# Patient Record
Sex: FEMALE | Race: WHITE | NOT HISPANIC OR LATINO | Employment: OTHER | URBAN - METROPOLITAN AREA
[De-identification: names, ages, dates, MRNs, and addresses within clinical notes are randomized per-mention and may not be internally consistent; named-entity substitution may affect disease eponyms.]

---

## 2023-03-08 ENCOUNTER — NURSING HOME VISIT (OUTPATIENT)
Dept: PRIMARY CARE | Facility: CLINIC | Age: 68
End: 2023-03-08
Payer: MEDICAID

## 2023-03-08 DIAGNOSIS — E78.2 MIXED HYPERLIPIDEMIA: ICD-10-CM

## 2023-03-08 DIAGNOSIS — I10 PRIMARY HYPERTENSION: Primary | ICD-10-CM

## 2023-03-08 DIAGNOSIS — R53.81 DEBILITY: ICD-10-CM

## 2023-03-08 PROCEDURE — 99308 SBSQ NF CARE LOW MDM 20: CPT | Performed by: FAMILY MEDICINE

## 2023-03-08 ASSESSMENT — ENCOUNTER SYMPTOMS: HYPERTENSION: 1

## 2023-03-08 NOTE — PROGRESS NOTES
Subjective   Patient ID: Fanny Charles is a 67 y.o. female who presents for Hyperlipidemia, Hypertension, debility, and weakness.    Encounter limited by pt. debility    Hyperlipidemia    Hypertension         Review of Systems   Reason unable to perform ROS: debility.       Objective   There were no vitals taken for this visit.    Physical Exam  Vitals and nursing note reviewed.   Constitutional:       Appearance: She is obese.   HENT:      Head: Normocephalic and atraumatic.      Nose: Nose normal.      Mouth/Throat:      Mouth: Mucous membranes are moist.      Pharynx: Oropharynx is clear.   Eyes:      Extraocular Movements: Extraocular movements intact.      Conjunctiva/sclera: Conjunctivae normal.      Pupils: Pupils are equal, round, and reactive to light.   Cardiovascular:      Rate and Rhythm: Normal rate and regular rhythm.      Pulses: Normal pulses.   Pulmonary:      Effort: Pulmonary effort is normal.      Breath sounds: Normal breath sounds.   Abdominal:      General: Bowel sounds are normal.      Palpations: Abdomen is soft.   Musculoskeletal:         General: Normal range of motion.      Cervical back: Normal range of motion and neck supple.   Skin:     General: Skin is warm and dry.      Capillary Refill: Capillary refill takes less than 2 seconds.   Neurological:      General: No focal deficit present.      Mental Status: She is disoriented.         Assessment/Plan

## 2023-03-15 ENCOUNTER — NURSING HOME VISIT (OUTPATIENT)
Dept: POST ACUTE CARE | Facility: EXTERNAL LOCATION | Age: 68
End: 2023-03-15
Payer: MEDICAID

## 2023-03-15 DIAGNOSIS — I10 HYPERTENSION, UNSPECIFIED TYPE: ICD-10-CM

## 2023-03-15 DIAGNOSIS — E66.01 MORBID OBESITY (MULTI): ICD-10-CM

## 2023-03-15 DIAGNOSIS — R53.81 DEBILITY: Primary | ICD-10-CM

## 2023-03-15 DIAGNOSIS — E78.5 HYPERLIPIDEMIA, UNSPECIFIED HYPERLIPIDEMIA TYPE: ICD-10-CM

## 2023-03-15 DIAGNOSIS — G89.4 CHRONIC PAIN SYNDROME: ICD-10-CM

## 2023-03-15 PROBLEM — G89.29 CHRONIC PAIN: Status: ACTIVE | Noted: 2023-03-15

## 2023-03-15 PROCEDURE — 99308 SBSQ NF CARE LOW MDM 20: CPT | Performed by: FAMILY MEDICINE

## 2023-03-15 NOTE — PROGRESS NOTES
Subjective   Patient ID: Fanny Charles is a 67 y.o. female who is long term resident being seen and evaluated for multiple medical problems.    Debility, weakness, htn, hld all same. Chronic pain same         Review of Systems   Reason unable to perform ROS: debility.       Objective   There were no vitals taken for this visit.    Physical Exam  Vitals and nursing note reviewed.   Constitutional:       Appearance: She is obese. She is ill-appearing.   HENT:      Head: Normocephalic and atraumatic.      Nose: Nose normal.      Mouth/Throat:      Mouth: Mucous membranes are moist.      Pharynx: Oropharynx is clear.   Eyes:      Extraocular Movements: Extraocular movements intact.      Conjunctiva/sclera: Conjunctivae normal.      Pupils: Pupils are equal, round, and reactive to light.   Cardiovascular:      Rate and Rhythm: Normal rate and regular rhythm.      Pulses: Normal pulses.   Pulmonary:      Effort: Pulmonary effort is normal.      Breath sounds: Normal breath sounds.   Abdominal:      General: Bowel sounds are normal.      Palpations: Abdomen is soft.   Musculoskeletal:         General: Normal range of motion.      Cervical back: Normal range of motion and neck supple.   Skin:     General: Skin is warm and dry.      Capillary Refill: Capillary refill takes less than 2 seconds.   Neurological:      General: No focal deficit present.      Motor: Weakness present.   Psychiatric:      Comments: Flat affect         Assessment/Plan   Problem List Items Addressed This Visit          Nervous    Chronic pain       Circulatory    HTN (hypertension)       Endocrine/Metabolic    Morbid obesity (CMS/HCC)       Other    Debility - Primary    HLD (hyperlipidemia)        Goals    None

## 2023-03-15 NOTE — LETTER
Patient: Fanny Charles  : 1955    Encounter Date: 03/15/2023    Subjective  Patient ID: Fanny Charles is a 67 y.o. female who is long term resident being seen and evaluated for multiple medical problems.    Debility, weakness, htn, hld all same. Chronic pain same         Review of Systems   Reason unable to perform ROS: debility.       Objective  There were no vitals taken for this visit.    Physical Exam  Vitals and nursing note reviewed.   Constitutional:       Appearance: She is obese. She is ill-appearing.   HENT:      Head: Normocephalic and atraumatic.      Nose: Nose normal.      Mouth/Throat:      Mouth: Mucous membranes are moist.      Pharynx: Oropharynx is clear.   Eyes:      Extraocular Movements: Extraocular movements intact.      Conjunctiva/sclera: Conjunctivae normal.      Pupils: Pupils are equal, round, and reactive to light.   Cardiovascular:      Rate and Rhythm: Normal rate and regular rhythm.      Pulses: Normal pulses.   Pulmonary:      Effort: Pulmonary effort is normal.      Breath sounds: Normal breath sounds.   Abdominal:      General: Bowel sounds are normal.      Palpations: Abdomen is soft.   Musculoskeletal:         General: Normal range of motion.      Cervical back: Normal range of motion and neck supple.   Skin:     General: Skin is warm and dry.      Capillary Refill: Capillary refill takes less than 2 seconds.   Neurological:      General: No focal deficit present.      Motor: Weakness present.   Psychiatric:      Comments: Flat affect         Assessment/Plan  Problem List Items Addressed This Visit          Nervous    Chronic pain       Circulatory    HTN (hypertension)       Endocrine/Metabolic    Morbid obesity (CMS/HCC)       Other    Debility - Primary    HLD (hyperlipidemia)        Goals    None           Electronically Signed By: Jacqui Wesley MD   3/15/23 12:10 PM

## 2023-03-23 ENCOUNTER — NURSING HOME VISIT (OUTPATIENT)
Dept: POST ACUTE CARE | Facility: EXTERNAL LOCATION | Age: 68
End: 2023-03-23
Payer: MEDICAID

## 2023-03-23 DIAGNOSIS — F31.70 BIPOLAR DISORDER IN PARTIAL REMISSION, MOST RECENT EPISODE UNSPECIFIED TYPE (MULTI): Primary | ICD-10-CM

## 2023-03-23 PROCEDURE — 99308 SBSQ NF CARE LOW MDM 20: CPT | Performed by: FAMILY MEDICINE

## 2023-03-23 NOTE — PROGRESS NOTES
Subjective   Patient ID: Fanny Charles is a 67 y.o. female who is long term resident being seen and evaluated for multiple medical problems.    All medical problems about the same, pt. Stable.         Review of Systems   Reason unable to perform ROS: debility, depressiojn.       Objective   There were no vitals taken for this visit.    Physical Exam  Vitals and nursing note reviewed.   Constitutional:       Appearance: She is obese. She is ill-appearing.   HENT:      Head: Normocephalic and atraumatic.      Nose: Nose normal.      Mouth/Throat:      Mouth: Mucous membranes are moist.      Pharynx: Oropharynx is clear.   Eyes:      Extraocular Movements: Extraocular movements intact.      Conjunctiva/sclera: Conjunctivae normal.      Pupils: Pupils are equal, round, and reactive to light.   Cardiovascular:      Rate and Rhythm: Normal rate and regular rhythm.      Pulses: Normal pulses.   Pulmonary:      Effort: Pulmonary effort is normal.      Breath sounds: Normal breath sounds.   Abdominal:      General: Bowel sounds are normal.      Palpations: Abdomen is soft.   Musculoskeletal:         General: Normal range of motion.      Cervical back: Normal range of motion and neck supple.   Skin:     General: Skin is warm and dry.      Capillary Refill: Capillary refill takes less than 2 seconds.   Neurological:      Mental Status: Mental status is at baseline.      Motor: Weakness present.   Psychiatric:      Comments: flat         Assessment/Plan All  conditions stable. Follow up 2 weeks  Problem List Items Addressed This Visit          Other    Bipolar disorder in partial remission, most recent episode unspecified type (CMS/HCC) - Primary        Goals    None

## 2023-03-23 NOTE — LETTER
Patient: Fanny Charles  : 1955    Encounter Date: 2023    Subjective  Patient ID: Fanny Charles is a 67 y.o. female who is long term resident being seen and evaluated for multiple medical problems.    All medical problems about the same, pt. Stable.         Review of Systems   Reason unable to perform ROS: debility, depressiojn.       Objective  There were no vitals taken for this visit.    Physical Exam  Vitals and nursing note reviewed.   Constitutional:       Appearance: She is obese. She is ill-appearing.   HENT:      Head: Normocephalic and atraumatic.      Nose: Nose normal.      Mouth/Throat:      Mouth: Mucous membranes are moist.      Pharynx: Oropharynx is clear.   Eyes:      Extraocular Movements: Extraocular movements intact.      Conjunctiva/sclera: Conjunctivae normal.      Pupils: Pupils are equal, round, and reactive to light.   Cardiovascular:      Rate and Rhythm: Normal rate and regular rhythm.      Pulses: Normal pulses.   Pulmonary:      Effort: Pulmonary effort is normal.      Breath sounds: Normal breath sounds.   Abdominal:      General: Bowel sounds are normal.      Palpations: Abdomen is soft.   Musculoskeletal:         General: Normal range of motion.      Cervical back: Normal range of motion and neck supple.   Skin:     General: Skin is warm and dry.      Capillary Refill: Capillary refill takes less than 2 seconds.   Neurological:      Mental Status: Mental status is at baseline.      Motor: Weakness present.   Psychiatric:      Comments: flat         Assessment/PlanAll  conditions stable. Follow up 2 weeks  Problem List Items Addressed This Visit          Other    Bipolar disorder in partial remission, most recent episode unspecified type (CMS/HCC) - Primary        Goals    None           Electronically Signed By: Jacqui Wesley MD   3/23/23 10:24 AM

## 2023-04-04 ENCOUNTER — NURSING HOME VISIT (OUTPATIENT)
Dept: POST ACUTE CARE | Facility: EXTERNAL LOCATION | Age: 68
End: 2023-04-04
Payer: MEDICAID

## 2023-04-04 DIAGNOSIS — E66.01 MORBID OBESITY (MULTI): ICD-10-CM

## 2023-04-04 DIAGNOSIS — E78.5 HYPERLIPIDEMIA, UNSPECIFIED HYPERLIPIDEMIA TYPE: ICD-10-CM

## 2023-04-04 DIAGNOSIS — G89.4 CHRONIC PAIN SYNDROME: ICD-10-CM

## 2023-04-04 DIAGNOSIS — I10 HYPERTENSION, UNSPECIFIED TYPE: ICD-10-CM

## 2023-04-04 DIAGNOSIS — F31.70 BIPOLAR DISORDER IN PARTIAL REMISSION, MOST RECENT EPISODE UNSPECIFIED TYPE (MULTI): ICD-10-CM

## 2023-04-04 DIAGNOSIS — R53.81 DEBILITY: Primary | ICD-10-CM

## 2023-04-04 PROCEDURE — 99308 SBSQ NF CARE LOW MDM 20: CPT | Performed by: FAMILY MEDICINE

## 2023-04-04 NOTE — PROGRESS NOTES
Subjective   Patient ID: Fanny Charles is a 67 y.o. female who is long term resident being seen and evaluated for multiple medical problems.    Htn, debility, bipolar depression, morbid obesity all the same.         Review of Systems   Reason unable to perform ROS: debility, bipolar depression.   All other systems reviewed and are negative.      Objective   There were no vitals taken for this visit.    Physical Exam  Vitals and nursing note reviewed.   Constitutional:       Appearance: She is obese. She is ill-appearing.   HENT:      Head: Normocephalic and atraumatic.      Nose: Nose normal.      Mouth/Throat:      Mouth: Mucous membranes are moist.      Pharynx: Oropharynx is clear.   Eyes:      Extraocular Movements: Extraocular movements intact.      Conjunctiva/sclera: Conjunctivae normal.      Pupils: Pupils are equal, round, and reactive to light.   Cardiovascular:      Rate and Rhythm: Normal rate and regular rhythm.      Pulses: Normal pulses.   Pulmonary:      Effort: Pulmonary effort is normal.      Breath sounds: Normal breath sounds.   Abdominal:      General: Bowel sounds are normal.      Palpations: Abdomen is soft.   Musculoskeletal:         General: Normal range of motion.      Cervical back: Normal range of motion and neck supple.   Skin:     General: Skin is warm and dry.      Capillary Refill: Capillary refill takes less than 2 seconds.   Neurological:      Mental Status: Mental status is at baseline.      Motor: Weakness present.      Coordination: Coordination abnormal.   Psychiatric:      Comments: flat         Assessment/Plan   Problem List Items Addressed This Visit          Nervous    Chronic pain       Circulatory    HTN (hypertension)       Endocrine/Metabolic    Morbid obesity (CMS/HCC)       Other    Debility - Primary    HLD (hyperlipidemia)    Bipolar disorder in partial remission, most recent episode unspecified type (CMS/HCC)        Goals    None

## 2023-04-04 NOTE — LETTER
Patient: Fanny Charles  : 1955    Encounter Date: 2023    Subjective  Patient ID: Fanny Charles is a 67 y.o. female who is long term resident being seen and evaluated for multiple medical problems.    Htn, debility, bipolar depression, morbid obesity all the same.         Review of Systems   Reason unable to perform ROS: debility, bipolar depression.   All other systems reviewed and are negative.      Objective  There were no vitals taken for this visit.    Physical Exam  Vitals and nursing note reviewed.   Constitutional:       Appearance: She is obese. She is ill-appearing.   HENT:      Head: Normocephalic and atraumatic.      Nose: Nose normal.      Mouth/Throat:      Mouth: Mucous membranes are moist.      Pharynx: Oropharynx is clear.   Eyes:      Extraocular Movements: Extraocular movements intact.      Conjunctiva/sclera: Conjunctivae normal.      Pupils: Pupils are equal, round, and reactive to light.   Cardiovascular:      Rate and Rhythm: Normal rate and regular rhythm.      Pulses: Normal pulses.   Pulmonary:      Effort: Pulmonary effort is normal.      Breath sounds: Normal breath sounds.   Abdominal:      General: Bowel sounds are normal.      Palpations: Abdomen is soft.   Musculoskeletal:         General: Normal range of motion.      Cervical back: Normal range of motion and neck supple.   Skin:     General: Skin is warm and dry.      Capillary Refill: Capillary refill takes less than 2 seconds.   Neurological:      Mental Status: Mental status is at baseline.      Motor: Weakness present.      Coordination: Coordination abnormal.   Psychiatric:      Comments: flat         Assessment/Plan  Problem List Items Addressed This Visit          Nervous    Chronic pain       Circulatory    HTN (hypertension)       Endocrine/Metabolic    Morbid obesity (CMS/HCC)       Other    Debility - Primary    HLD (hyperlipidemia)    Bipolar disorder in partial remission, most recent episode unspecified type  (CMS/[facility])        Goals    None           Electronically Signed By: Jacqui Wesley MD   4/4/23  4:40 PM

## 2023-04-12 ENCOUNTER — NURSING HOME VISIT (OUTPATIENT)
Dept: POST ACUTE CARE | Facility: EXTERNAL LOCATION | Age: 68
End: 2023-04-12
Payer: MEDICAID

## 2023-04-12 DIAGNOSIS — G89.4 CHRONIC PAIN SYNDROME: ICD-10-CM

## 2023-04-12 DIAGNOSIS — E78.5 HYPERLIPIDEMIA, UNSPECIFIED HYPERLIPIDEMIA TYPE: ICD-10-CM

## 2023-04-12 DIAGNOSIS — E66.01 MORBID OBESITY (MULTI): ICD-10-CM

## 2023-04-12 DIAGNOSIS — R53.81 DEBILITY: Primary | ICD-10-CM

## 2023-04-12 DIAGNOSIS — F31.70 BIPOLAR DISORDER IN PARTIAL REMISSION, MOST RECENT EPISODE UNSPECIFIED TYPE (MULTI): ICD-10-CM

## 2023-04-12 PROCEDURE — 99308 SBSQ NF CARE LOW MDM 20: CPT | Performed by: FAMILY MEDICINE

## 2023-04-12 NOTE — LETTER
Patient: Fanny Charles  : 1955    Encounter Date: 2023    Subjective  Patient ID: Fanny Charles is a 68 y.o. female who is long term resident being seen and evaluated for multiple medical problems.    HPI     Review of Systems   Reason unable to perform ROS: debility.       Objective  There were no vitals taken for this visit.    Physical Exam  Vitals and nursing note reviewed.   Constitutional:       Appearance: She is obese. She is ill-appearing.   HENT:      Head: Normocephalic and atraumatic.      Nose: Nose normal.      Mouth/Throat:      Mouth: Mucous membranes are moist.      Pharynx: Oropharynx is clear.   Eyes:      Extraocular Movements: Extraocular movements intact.      Conjunctiva/sclera: Conjunctivae normal.      Pupils: Pupils are equal, round, and reactive to light.   Cardiovascular:      Rate and Rhythm: Normal rate and regular rhythm.      Pulses: Normal pulses.   Pulmonary:      Effort: Pulmonary effort is normal.      Breath sounds: Normal breath sounds.   Abdominal:      General: Bowel sounds are normal.      Palpations: Abdomen is soft.   Musculoskeletal:         General: Tenderness present. Normal range of motion.      Cervical back: Normal range of motion and neck supple.   Skin:     General: Skin is warm and dry.      Capillary Refill: Capillary refill takes less than 2 seconds.   Neurological:      General: No focal deficit present.      Mental Status: She is alert.   Psychiatric:      Comments: flat         Assessment/Plan  Problem List Items Addressed This Visit          Nervous    Chronic pain       Endocrine/Metabolic    Morbid obesity (CMS/HCC)       Other    Debility - Primary    HLD (hyperlipidemia)    Bipolar disorder in partial remission, most recent episode unspecified type (CMS/HCC)        Goals    None           Electronically Signed By: Jacqui Wesley MD   23 10:20 AM

## 2023-04-12 NOTE — PROGRESS NOTES
Subjective   Patient ID: Fanny Charles is a 68 y.o. female who is long term resident being seen and evaluated for multiple medical problems.    HPI     Review of Systems   Reason unable to perform ROS: debility.       Objective   There were no vitals taken for this visit.    Physical Exam  Vitals and nursing note reviewed.   Constitutional:       Appearance: She is obese. She is ill-appearing.   HENT:      Head: Normocephalic and atraumatic.      Nose: Nose normal.      Mouth/Throat:      Mouth: Mucous membranes are moist.      Pharynx: Oropharynx is clear.   Eyes:      Extraocular Movements: Extraocular movements intact.      Conjunctiva/sclera: Conjunctivae normal.      Pupils: Pupils are equal, round, and reactive to light.   Cardiovascular:      Rate and Rhythm: Normal rate and regular rhythm.      Pulses: Normal pulses.   Pulmonary:      Effort: Pulmonary effort is normal.      Breath sounds: Normal breath sounds.   Abdominal:      General: Bowel sounds are normal.      Palpations: Abdomen is soft.   Musculoskeletal:         General: Tenderness present. Normal range of motion.      Cervical back: Normal range of motion and neck supple.   Skin:     General: Skin is warm and dry.      Capillary Refill: Capillary refill takes less than 2 seconds.   Neurological:      General: No focal deficit present.      Mental Status: She is alert.   Psychiatric:      Comments: flat         Assessment/Plan   Problem List Items Addressed This Visit          Nervous    Chronic pain       Endocrine/Metabolic    Morbid obesity (CMS/HCC)       Other    Debility - Primary    HLD (hyperlipidemia)    Bipolar disorder in partial remission, most recent episode unspecified type (CMS/HCC)        Goals    None

## 2023-04-20 ENCOUNTER — NURSING HOME VISIT (OUTPATIENT)
Dept: POST ACUTE CARE | Facility: EXTERNAL LOCATION | Age: 68
End: 2023-04-20
Payer: MEDICAID

## 2023-04-20 DIAGNOSIS — F31.70 BIPOLAR DISORDER IN PARTIAL REMISSION, MOST RECENT EPISODE UNSPECIFIED TYPE (MULTI): ICD-10-CM

## 2023-04-20 DIAGNOSIS — I10 HYPERTENSION, UNSPECIFIED TYPE: Primary | ICD-10-CM

## 2023-04-20 DIAGNOSIS — G89.4 CHRONIC PAIN SYNDROME: ICD-10-CM

## 2023-04-20 DIAGNOSIS — E78.5 HYPERLIPIDEMIA, UNSPECIFIED HYPERLIPIDEMIA TYPE: ICD-10-CM

## 2023-04-20 DIAGNOSIS — E66.01 MORBID OBESITY (MULTI): ICD-10-CM

## 2023-04-20 DIAGNOSIS — R53.81 DEBILITY: ICD-10-CM

## 2023-04-20 PROCEDURE — 99308 SBSQ NF CARE LOW MDM 20: CPT | Performed by: FAMILY MEDICINE

## 2023-04-20 NOTE — PROGRESS NOTES
Subjective   Patient ID: Fanny Charles is a 68 y.o. female who is long term resident being seen and evaluated for multiple medical problems.    Morbid obesity, HTN, HLD, debility and weakness all the same.         Review of Systems   Reason unable to perform ROS: debility.       Objective   There were no vitals taken for this visit.    Physical Exam  Vitals and nursing note reviewed.   Constitutional:       Appearance: She is obese. She is ill-appearing.   HENT:      Head: Normocephalic and atraumatic.      Nose: Nose normal.      Mouth/Throat:      Mouth: Mucous membranes are moist.      Pharynx: Oropharynx is clear.   Eyes:      Extraocular Movements: Extraocular movements intact.      Conjunctiva/sclera: Conjunctivae normal.      Pupils: Pupils are equal, round, and reactive to light.   Cardiovascular:      Rate and Rhythm: Normal rate and regular rhythm.      Pulses: Normal pulses.   Pulmonary:      Effort: Pulmonary effort is normal.      Breath sounds: Normal breath sounds.   Abdominal:      General: Bowel sounds are normal.      Palpations: Abdomen is soft.   Musculoskeletal:         General: Normal range of motion.      Cervical back: Normal range of motion and neck supple.   Skin:     General: Skin is warm and dry.      Capillary Refill: Capillary refill takes less than 2 seconds.   Neurological:      General: No focal deficit present.      Mental Status: She is alert.   Psychiatric:         Mood and Affect: Affect is flat.         Assessment/Plan   Problem List Items Addressed This Visit          Nervous    Chronic pain       Circulatory    HTN (hypertension) - Primary       Endocrine/Metabolic    Morbid obesity (CMS/HCC)       Other    Debility    HLD (hyperlipidemia)    Bipolar disorder in partial remission, most recent episode unspecified type (CMS/HCC)        Goals    None

## 2023-04-20 NOTE — LETTER
Patient: Fanny Charles  : 1955    Encounter Date: 2023    Subjective  Patient ID: Fanny Charles is a 68 y.o. female who is long term resident being seen and evaluated for multiple medical problems.    Morbid obesity, HTN, HLD, debility and weakness all the same.         Review of Systems   Reason unable to perform ROS: debility.       Objective  There were no vitals taken for this visit.    Physical Exam  Vitals and nursing note reviewed.   Constitutional:       Appearance: She is obese. She is ill-appearing.   HENT:      Head: Normocephalic and atraumatic.      Nose: Nose normal.      Mouth/Throat:      Mouth: Mucous membranes are moist.      Pharynx: Oropharynx is clear.   Eyes:      Extraocular Movements: Extraocular movements intact.      Conjunctiva/sclera: Conjunctivae normal.      Pupils: Pupils are equal, round, and reactive to light.   Cardiovascular:      Rate and Rhythm: Normal rate and regular rhythm.      Pulses: Normal pulses.   Pulmonary:      Effort: Pulmonary effort is normal.      Breath sounds: Normal breath sounds.   Abdominal:      General: Bowel sounds are normal.      Palpations: Abdomen is soft.   Musculoskeletal:         General: Normal range of motion.      Cervical back: Normal range of motion and neck supple.   Skin:     General: Skin is warm and dry.      Capillary Refill: Capillary refill takes less than 2 seconds.   Neurological:      General: No focal deficit present.      Mental Status: She is alert.   Psychiatric:         Mood and Affect: Affect is flat.         Assessment/Plan  Problem List Items Addressed This Visit          Nervous    Chronic pain       Circulatory    HTN (hypertension) - Primary       Endocrine/Metabolic    Morbid obesity (CMS/HCC)       Other    Debility    HLD (hyperlipidemia)    Bipolar disorder in partial remission, most recent episode unspecified type (CMS/HCC)        Goals    None           Electronically Signed By: Jacqui Wesley MD   23   1:48 PM

## 2023-04-28 ENCOUNTER — NURSING HOME VISIT (OUTPATIENT)
Dept: POST ACUTE CARE | Facility: EXTERNAL LOCATION | Age: 68
End: 2023-04-28
Payer: MEDICAID

## 2023-04-28 DIAGNOSIS — R53.81 DEBILITY: Primary | ICD-10-CM

## 2023-04-28 DIAGNOSIS — E66.01 MORBID OBESITY (MULTI): ICD-10-CM

## 2023-04-28 DIAGNOSIS — F31.70 BIPOLAR DISORDER IN PARTIAL REMISSION, MOST RECENT EPISODE UNSPECIFIED TYPE (MULTI): ICD-10-CM

## 2023-04-28 DIAGNOSIS — I10 HYPERTENSION, UNSPECIFIED TYPE: ICD-10-CM

## 2023-04-28 DIAGNOSIS — G89.4 CHRONIC PAIN SYNDROME: ICD-10-CM

## 2023-04-28 DIAGNOSIS — E78.5 HYPERLIPIDEMIA, UNSPECIFIED HYPERLIPIDEMIA TYPE: ICD-10-CM

## 2023-04-28 PROCEDURE — 99308 SBSQ NF CARE LOW MDM 20: CPT | Performed by: FAMILY MEDICINE

## 2023-04-28 NOTE — LETTER
Patient: Fanny Charles  : 1955    Encounter Date: 2023    Subjective  Patient ID: Fanny Charles is a 68 y.o. female who is long term resident being seen and evaluated for multiple medical problems.    Debility, chronic pain, bipolar depression, morbid obesity, hld,          Review of Systems   Reason unable to perform ROS: debility.       Objective  There were no vitals taken for this visit.    Physical Exam  Vitals and nursing note reviewed.   Constitutional:       Appearance: She is ill-appearing.   HENT:      Head: Normocephalic and atraumatic.      Nose: Nose normal.      Mouth/Throat:      Mouth: Mucous membranes are moist.      Pharynx: Oropharynx is clear.   Eyes:      Extraocular Movements: Extraocular movements intact.      Conjunctiva/sclera: Conjunctivae normal.      Pupils: Pupils are equal, round, and reactive to light.   Cardiovascular:      Rate and Rhythm: Normal rate and regular rhythm.      Pulses: Normal pulses.   Pulmonary:      Effort: Pulmonary effort is normal.      Breath sounds: Normal breath sounds.   Abdominal:      General: Bowel sounds are normal.      Palpations: Abdomen is soft.   Musculoskeletal:         General: Tenderness present. Normal range of motion.      Cervical back: Normal range of motion and neck supple.   Skin:     General: Skin is warm and dry.      Capillary Refill: Capillary refill takes less than 2 seconds.      Coloration: Skin is pale.   Neurological:      Mental Status: Mental status is at baseline.      Motor: Weakness present.      Gait: Gait abnormal.   Psychiatric:         Mood and Affect: Mood normal.         Behavior: Behavior normal.         Thought Content: Thought content normal.         Judgment: Judgment normal.         Assessment/Plan  Problem List Items Addressed This Visit          Nervous    Chronic pain       Circulatory    HTN (hypertension)       Endocrine/Metabolic    Morbid obesity (CMS/HCC)       Other    Debility - Primary    HLD  (hyperlipidemia)    Bipolar disorder in partial remission, most recent episode unspecified type (CMS/[facility])        Goals    None           Electronically Signed By: Jacqui Wesley MD   4/28/23  1:00 PM

## 2023-04-28 NOTE — PROGRESS NOTES
Subjective   Patient ID: Fanny Charles is a 68 y.o. female who is long term resident being seen and evaluated for multiple medical problems.    Debility, chronic pain, bipolar depression, morbid obesity, hld,          Review of Systems   Reason unable to perform ROS: debility.       Objective   There were no vitals taken for this visit.    Physical Exam  Vitals and nursing note reviewed.   Constitutional:       Appearance: She is ill-appearing.   HENT:      Head: Normocephalic and atraumatic.      Nose: Nose normal.      Mouth/Throat:      Mouth: Mucous membranes are moist.      Pharynx: Oropharynx is clear.   Eyes:      Extraocular Movements: Extraocular movements intact.      Conjunctiva/sclera: Conjunctivae normal.      Pupils: Pupils are equal, round, and reactive to light.   Cardiovascular:      Rate and Rhythm: Normal rate and regular rhythm.      Pulses: Normal pulses.   Pulmonary:      Effort: Pulmonary effort is normal.      Breath sounds: Normal breath sounds.   Abdominal:      General: Bowel sounds are normal.      Palpations: Abdomen is soft.   Musculoskeletal:         General: Tenderness present. Normal range of motion.      Cervical back: Normal range of motion and neck supple.   Skin:     General: Skin is warm and dry.      Capillary Refill: Capillary refill takes less than 2 seconds.      Coloration: Skin is pale.   Neurological:      Mental Status: Mental status is at baseline.      Motor: Weakness present.      Gait: Gait abnormal.   Psychiatric:         Mood and Affect: Mood normal.         Behavior: Behavior normal.         Thought Content: Thought content normal.         Judgment: Judgment normal.         Assessment/Plan   Problem List Items Addressed This Visit          Nervous    Chronic pain       Circulatory    HTN (hypertension)       Endocrine/Metabolic    Morbid obesity (CMS/HCC)       Other    Debility - Primary    HLD (hyperlipidemia)    Bipolar disorder in partial remission, most  recent episode unspecified type (CMS/[facility])        Goals    None

## 2023-05-08 ENCOUNTER — NURSING HOME VISIT (OUTPATIENT)
Dept: POST ACUTE CARE | Facility: EXTERNAL LOCATION | Age: 68
End: 2023-05-08
Payer: MEDICARE

## 2023-05-08 DIAGNOSIS — R53.81 DEBILITY: Primary | ICD-10-CM

## 2023-05-08 DIAGNOSIS — G89.4 CHRONIC PAIN SYNDROME: ICD-10-CM

## 2023-05-08 DIAGNOSIS — F31.70 BIPOLAR DISORDER IN PARTIAL REMISSION, MOST RECENT EPISODE UNSPECIFIED TYPE (MULTI): ICD-10-CM

## 2023-05-08 DIAGNOSIS — E78.5 HYPERLIPIDEMIA, UNSPECIFIED HYPERLIPIDEMIA TYPE: ICD-10-CM

## 2023-05-08 DIAGNOSIS — E66.01 MORBID OBESITY (MULTI): ICD-10-CM

## 2023-05-08 DIAGNOSIS — I10 HYPERTENSION, UNSPECIFIED TYPE: ICD-10-CM

## 2023-05-08 PROCEDURE — 99308 SBSQ NF CARE LOW MDM 20: CPT | Performed by: FAMILY MEDICINE

## 2023-05-08 NOTE — LETTER
Patient: Fanny Charles  : 1955    Encounter Date: 2023    Subjective  Patient ID: Fanny Charles is a 68 y.o. female who is long term resident being seen and evaluated for multiple medical problems.    Debility, morbid obesity, htn, hld,          Review of Systems   Reason unable to perform ROS: debility.       Objective  There were no vitals taken for this visit.    Physical Exam  Vitals and nursing note reviewed.   Constitutional:       Appearance: She is obese. She is ill-appearing.   HENT:      Head: Normocephalic and atraumatic.      Nose: Nose normal.      Mouth/Throat:      Mouth: Mucous membranes are moist.      Pharynx: Oropharynx is clear.   Eyes:      Extraocular Movements: Extraocular movements intact.      Conjunctiva/sclera: Conjunctivae normal.      Pupils: Pupils are equal, round, and reactive to light.   Cardiovascular:      Rate and Rhythm: Normal rate and regular rhythm.      Pulses: Normal pulses.   Pulmonary:      Effort: Pulmonary effort is normal.      Breath sounds: Normal breath sounds.   Abdominal:      General: Bowel sounds are normal.      Palpations: Abdomen is soft.   Musculoskeletal:         General: Normal range of motion.      Cervical back: Normal range of motion and neck supple.   Skin:     General: Skin is warm and dry.      Capillary Refill: Capillary refill takes less than 2 seconds.   Neurological:      General: No focal deficit present.      Mental Status: She is alert.      Motor: Weakness present.      Gait: Gait abnormal.      Deep Tendon Reflexes: Reflexes abnormal.   Psychiatric:      Comments: flat         Assessment/Plan  Problem List Items Addressed This Visit          Nervous    Chronic pain       Circulatory    HTN (hypertension)       Endocrine/Metabolic    Morbid obesity (CMS/HCC)       Other    Debility - Primary    HLD (hyperlipidemia)    Bipolar disorder in partial remission, most recent episode unspecified type (CMS/HCC)        Goals    None            Electronically Signed By: Jacqui Wesley MD   5/9/23  2:59 PM

## 2023-05-09 ENCOUNTER — TELEPHONE (OUTPATIENT)
Dept: POST ACUTE CARE | Facility: EXTERNAL LOCATION | Age: 68
End: 2023-05-09

## 2023-05-09 NOTE — PROGRESS NOTES
Subjective   Patient ID: Fanny Charles is a 68 y.o. female who is long term resident being seen and evaluated for multiple medical problems.    Debility, morbid obesity, htn, hld,          Review of Systems   Reason unable to perform ROS: debility.       Objective   There were no vitals taken for this visit.    Physical Exam  Vitals and nursing note reviewed.   Constitutional:       Appearance: She is obese. She is ill-appearing.   HENT:      Head: Normocephalic and atraumatic.      Nose: Nose normal.      Mouth/Throat:      Mouth: Mucous membranes are moist.      Pharynx: Oropharynx is clear.   Eyes:      Extraocular Movements: Extraocular movements intact.      Conjunctiva/sclera: Conjunctivae normal.      Pupils: Pupils are equal, round, and reactive to light.   Cardiovascular:      Rate and Rhythm: Normal rate and regular rhythm.      Pulses: Normal pulses.   Pulmonary:      Effort: Pulmonary effort is normal.      Breath sounds: Normal breath sounds.   Abdominal:      General: Bowel sounds are normal.      Palpations: Abdomen is soft.   Musculoskeletal:         General: Normal range of motion.      Cervical back: Normal range of motion and neck supple.   Skin:     General: Skin is warm and dry.      Capillary Refill: Capillary refill takes less than 2 seconds.   Neurological:      General: No focal deficit present.      Mental Status: She is alert.      Motor: Weakness present.      Gait: Gait abnormal.      Deep Tendon Reflexes: Reflexes abnormal.   Psychiatric:      Comments: flat         Assessment/Plan   Problem List Items Addressed This Visit          Nervous    Chronic pain       Circulatory    HTN (hypertension)       Endocrine/Metabolic    Morbid obesity (CMS/HCC)       Other    Debility - Primary    HLD (hyperlipidemia)    Bipolar disorder in partial remission, most recent episode unspecified type (CMS/HCC)        Goals    None

## 2023-05-18 ENCOUNTER — NURSING HOME VISIT (OUTPATIENT)
Dept: POST ACUTE CARE | Facility: EXTERNAL LOCATION | Age: 68
End: 2023-05-18
Payer: MEDICARE

## 2023-05-18 DIAGNOSIS — E78.5 HYPERLIPIDEMIA, UNSPECIFIED HYPERLIPIDEMIA TYPE: ICD-10-CM

## 2023-05-18 DIAGNOSIS — I10 HYPERTENSION, UNSPECIFIED TYPE: ICD-10-CM

## 2023-05-18 DIAGNOSIS — F31.70 BIPOLAR DISORDER IN PARTIAL REMISSION, MOST RECENT EPISODE UNSPECIFIED TYPE (MULTI): ICD-10-CM

## 2023-05-18 DIAGNOSIS — G89.4 CHRONIC PAIN SYNDROME: Primary | ICD-10-CM

## 2023-05-18 DIAGNOSIS — E66.01 MORBID OBESITY (MULTI): ICD-10-CM

## 2023-05-18 PROCEDURE — 99308 SBSQ NF CARE LOW MDM 20: CPT | Performed by: FAMILY MEDICINE

## 2023-05-18 NOTE — PROGRESS NOTES
Subjective   Patient ID: Fanny Charles is a 68 y.o. female who is long term resident being seen and evaluated for multiple medical problems.    Debility, bipolar depression, htn, hld,          Review of Systems   Reason unable to perform ROS: debility.       Objective   There were no vitals taken for this visit.    Physical Exam  Vitals and nursing note reviewed.   Constitutional:       Appearance: She is obese. She is ill-appearing.   HENT:      Head: Normocephalic and atraumatic.      Nose: Nose normal.      Mouth/Throat:      Mouth: Mucous membranes are moist.      Pharynx: Oropharynx is clear.   Eyes:      Extraocular Movements: Extraocular movements intact.      Conjunctiva/sclera: Conjunctivae normal.      Pupils: Pupils are equal, round, and reactive to light.   Cardiovascular:      Rate and Rhythm: Normal rate and regular rhythm.      Pulses: Normal pulses.   Pulmonary:      Effort: Pulmonary effort is normal.      Breath sounds: Normal breath sounds.   Abdominal:      General: Bowel sounds are normal.      Palpations: Abdomen is soft.   Musculoskeletal:         General: Tenderness present. Normal range of motion.      Cervical back: Normal range of motion and neck supple.   Skin:     General: Skin is warm and dry.      Capillary Refill: Capillary refill takes less than 2 seconds.      Coloration: Skin is pale.   Neurological:      Mental Status: Mental status is at baseline.      Motor: Weakness present.      Gait: Gait abnormal.      Deep Tendon Reflexes: Reflexes abnormal.   Psychiatric:      Comments: flat         Assessment/Plan   Problem List Items Addressed This Visit          Nervous    Chronic pain - Primary       Circulatory    HTN (hypertension)       Endocrine/Metabolic    Morbid obesity (CMS/HCC)       Other    HLD (hyperlipidemia)    Bipolar disorder in partial remission, most recent episode unspecified type (CMS/HCC)        Goals    None

## 2023-05-18 NOTE — LETTER
Patient: Fanny Charles  : 1955    Encounter Date: 2023    Subjective  Patient ID: Fanny Charles is a 68 y.o. female who is long term resident being seen and evaluated for multiple medical problems.    Debility, bipolar depression, htn, hld,          Review of Systems   Reason unable to perform ROS: debility.       Objective  There were no vitals taken for this visit.    Physical Exam  Vitals and nursing note reviewed.   Constitutional:       Appearance: She is obese. She is ill-appearing.   HENT:      Head: Normocephalic and atraumatic.      Nose: Nose normal.      Mouth/Throat:      Mouth: Mucous membranes are moist.      Pharynx: Oropharynx is clear.   Eyes:      Extraocular Movements: Extraocular movements intact.      Conjunctiva/sclera: Conjunctivae normal.      Pupils: Pupils are equal, round, and reactive to light.   Cardiovascular:      Rate and Rhythm: Normal rate and regular rhythm.      Pulses: Normal pulses.   Pulmonary:      Effort: Pulmonary effort is normal.      Breath sounds: Normal breath sounds.   Abdominal:      General: Bowel sounds are normal.      Palpations: Abdomen is soft.   Musculoskeletal:         General: Tenderness present. Normal range of motion.      Cervical back: Normal range of motion and neck supple.   Skin:     General: Skin is warm and dry.      Capillary Refill: Capillary refill takes less than 2 seconds.      Coloration: Skin is pale.   Neurological:      Mental Status: Mental status is at baseline.      Motor: Weakness present.      Gait: Gait abnormal.      Deep Tendon Reflexes: Reflexes abnormal.   Psychiatric:      Comments: flat         Assessment/Plan  Problem List Items Addressed This Visit          Nervous    Chronic pain - Primary       Circulatory    HTN (hypertension)       Endocrine/Metabolic    Morbid obesity (CMS/HCC)       Other    HLD (hyperlipidemia)    Bipolar disorder in partial remission, most recent episode unspecified type (CMS/HCC)         Goals    None           Electronically Signed By: Jcaqui Wesley MD   5/18/23 11:59 AM

## 2023-06-06 ENCOUNTER — NURSING HOME VISIT (OUTPATIENT)
Dept: POST ACUTE CARE | Facility: EXTERNAL LOCATION | Age: 68
End: 2023-06-06
Payer: MEDICARE

## 2023-06-06 DIAGNOSIS — E66.01 MORBID OBESITY (MULTI): ICD-10-CM

## 2023-06-06 DIAGNOSIS — I10 HYPERTENSION, UNSPECIFIED TYPE: ICD-10-CM

## 2023-06-06 DIAGNOSIS — E78.5 HYPERLIPIDEMIA, UNSPECIFIED HYPERLIPIDEMIA TYPE: ICD-10-CM

## 2023-06-06 DIAGNOSIS — F31.70 BIPOLAR DISORDER IN PARTIAL REMISSION, MOST RECENT EPISODE UNSPECIFIED TYPE (MULTI): ICD-10-CM

## 2023-06-06 DIAGNOSIS — G89.4 CHRONIC PAIN SYNDROME: ICD-10-CM

## 2023-06-06 DIAGNOSIS — R53.81 DEBILITY: Primary | ICD-10-CM

## 2023-06-06 PROCEDURE — 99308 SBSQ NF CARE LOW MDM 20: CPT | Performed by: FAMILY MEDICINE

## 2023-06-06 NOTE — PROGRESS NOTES
Subjective   Patient ID: Fanny Charles is a 68 y.o. female who is long term resident being seen and evaluated for multiple medical problems.    Debility, htn, obesity, hld, bipolar depression         Review of Systems   Reason unable to perform ROS: debility.       Objective   There were no vitals taken for this visit.    Physical Exam  Vitals and nursing note reviewed.   Constitutional:       Appearance: She is obese. She is ill-appearing.   HENT:      Head: Normocephalic and atraumatic.      Nose: Nose normal.      Mouth/Throat:      Mouth: Mucous membranes are moist.      Pharynx: Oropharynx is clear.   Eyes:      Extraocular Movements: Extraocular movements intact.      Conjunctiva/sclera: Conjunctivae normal.      Pupils: Pupils are equal, round, and reactive to light.   Cardiovascular:      Rate and Rhythm: Normal rate and regular rhythm.      Pulses: Normal pulses.   Pulmonary:      Effort: Pulmonary effort is normal.      Breath sounds: Normal breath sounds.   Abdominal:      General: Bowel sounds are normal.      Palpations: Abdomen is soft.   Musculoskeletal:         General: Tenderness present. Normal range of motion.      Cervical back: Normal range of motion and neck supple.   Skin:     General: Skin is warm and dry.      Capillary Refill: Capillary refill takes less than 2 seconds.      Coloration: Skin is pale.   Neurological:      Mental Status: She is alert and oriented to person, place, and time. Mental status is at baseline.   Psychiatric:      Comments: flat         Assessment/Plan   Problem List Items Addressed This Visit    None         Goals    None

## 2023-06-06 NOTE — LETTER
Patient: Fanny Charles  : 1955    Encounter Date: 2023    Subjective  Patient ID: Fanny Charles is a 68 y.o. female who is long term resident being seen and evaluated for multiple medical problems.    Debility, htn, obesity, hld, bipolar depression         Review of Systems   Reason unable to perform ROS: debility.       Objective  There were no vitals taken for this visit.    Physical Exam  Vitals and nursing note reviewed.   Constitutional:       Appearance: She is obese. She is ill-appearing.   HENT:      Head: Normocephalic and atraumatic.      Nose: Nose normal.      Mouth/Throat:      Mouth: Mucous membranes are moist.      Pharynx: Oropharynx is clear.   Eyes:      Extraocular Movements: Extraocular movements intact.      Conjunctiva/sclera: Conjunctivae normal.      Pupils: Pupils are equal, round, and reactive to light.   Cardiovascular:      Rate and Rhythm: Normal rate and regular rhythm.      Pulses: Normal pulses.   Pulmonary:      Effort: Pulmonary effort is normal.      Breath sounds: Normal breath sounds.   Abdominal:      General: Bowel sounds are normal.      Palpations: Abdomen is soft.   Musculoskeletal:         General: Tenderness present. Normal range of motion.      Cervical back: Normal range of motion and neck supple.   Skin:     General: Skin is warm and dry.      Capillary Refill: Capillary refill takes less than 2 seconds.      Coloration: Skin is pale.   Neurological:      Mental Status: She is alert and oriented to person, place, and time. Mental status is at baseline.   Psychiatric:      Comments: flat         Assessment/Plan  Problem List Items Addressed This Visit    None         Goals    None           Electronically Signed By: Jacqui Wesley MD   23  3:24 PM

## 2023-06-14 ENCOUNTER — NURSING HOME VISIT (OUTPATIENT)
Dept: POST ACUTE CARE | Facility: EXTERNAL LOCATION | Age: 68
End: 2023-06-14
Payer: MEDICAID

## 2023-06-14 DIAGNOSIS — G89.4 CHRONIC PAIN SYNDROME: ICD-10-CM

## 2023-06-14 DIAGNOSIS — E66.01 MORBID OBESITY (MULTI): ICD-10-CM

## 2023-06-14 DIAGNOSIS — R53.81 DEBILITY: Primary | ICD-10-CM

## 2023-06-14 DIAGNOSIS — F31.70 BIPOLAR DISORDER IN PARTIAL REMISSION, MOST RECENT EPISODE UNSPECIFIED TYPE (MULTI): ICD-10-CM

## 2023-06-14 DIAGNOSIS — I10 HYPERTENSION, UNSPECIFIED TYPE: ICD-10-CM

## 2023-06-14 DIAGNOSIS — E78.5 HYPERLIPIDEMIA, UNSPECIFIED HYPERLIPIDEMIA TYPE: ICD-10-CM

## 2023-06-14 PROCEDURE — 99308 SBSQ NF CARE LOW MDM 20: CPT | Performed by: FAMILY MEDICINE

## 2023-06-14 NOTE — LETTER
Patient: Fanny Charles  : 1955    Encounter Date: 2023    Subjective  Patient ID: Fanny Charles is a 68 y.o. female who is long term resident being seen and evaluated for multiple medical problems.    Debility, bipolar depression, morbid obesity, ambulatory dysfunction, htn, hld         Review of Systems   Reason unable to perform ROS: debility.       Objective  There were no vitals taken for this visit.    Physical Exam  Vitals and nursing note reviewed.   Constitutional:       Appearance: She is obese. She is ill-appearing.   HENT:      Head: Normocephalic and atraumatic.      Nose: Nose normal.      Mouth/Throat:      Mouth: Mucous membranes are moist.      Pharynx: Oropharynx is clear.   Eyes:      Extraocular Movements: Extraocular movements intact.      Conjunctiva/sclera: Conjunctivae normal.      Pupils: Pupils are equal, round, and reactive to light.   Cardiovascular:      Rate and Rhythm: Normal rate and regular rhythm.      Pulses: Normal pulses.   Pulmonary:      Effort: Pulmonary effort is normal.      Breath sounds: Normal breath sounds.   Abdominal:      General: Bowel sounds are normal.      Palpations: Abdomen is soft.   Musculoskeletal:         General: Tenderness present. Normal range of motion.      Cervical back: Normal range of motion and neck supple.   Skin:     General: Skin is warm and dry.      Capillary Refill: Capillary refill takes less than 2 seconds.   Neurological:      Mental Status: She is alert. Mental status is at baseline.      Motor: Weakness present.      Coordination: Coordination abnormal.      Gait: Gait abnormal.      Deep Tendon Reflexes: Reflexes abnormal.         Assessment/Plan  Problem List Items Addressed This Visit          Nervous    Chronic pain       Circulatory    HTN (hypertension)       Endocrine/Metabolic    Morbid obesity (CMS/HCC)       Other    Debility - Primary    HLD (hyperlipidemia)    Bipolar disorder in partial remission, most recent  episode unspecified type (CMS/[facility])        Goals    None           Electronically Signed By: Jacqui Wesley MD   6/14/23 12:34 PM

## 2023-06-14 NOTE — PROGRESS NOTES
Subjective   Patient ID: Fanny Charles is a 68 y.o. female who is long term resident being seen and evaluated for multiple medical problems.    Debility, bipolar depression, morbid obesity, ambulatory dysfunction, htn, hld         Review of Systems   Reason unable to perform ROS: debility.       Objective   There were no vitals taken for this visit.    Physical Exam  Vitals and nursing note reviewed.   Constitutional:       Appearance: She is obese. She is ill-appearing.   HENT:      Head: Normocephalic and atraumatic.      Nose: Nose normal.      Mouth/Throat:      Mouth: Mucous membranes are moist.      Pharynx: Oropharynx is clear.   Eyes:      Extraocular Movements: Extraocular movements intact.      Conjunctiva/sclera: Conjunctivae normal.      Pupils: Pupils are equal, round, and reactive to light.   Cardiovascular:      Rate and Rhythm: Normal rate and regular rhythm.      Pulses: Normal pulses.   Pulmonary:      Effort: Pulmonary effort is normal.      Breath sounds: Normal breath sounds.   Abdominal:      General: Bowel sounds are normal.      Palpations: Abdomen is soft.   Musculoskeletal:         General: Tenderness present. Normal range of motion.      Cervical back: Normal range of motion and neck supple.   Skin:     General: Skin is warm and dry.      Capillary Refill: Capillary refill takes less than 2 seconds.   Neurological:      Mental Status: She is alert. Mental status is at baseline.      Motor: Weakness present.      Coordination: Coordination abnormal.      Gait: Gait abnormal.      Deep Tendon Reflexes: Reflexes abnormal.         Assessment/Plan   Problem List Items Addressed This Visit          Nervous    Chronic pain       Circulatory    HTN (hypertension)       Endocrine/Metabolic    Morbid obesity (CMS/HCC)       Other    Debility - Primary    HLD (hyperlipidemia)    Bipolar disorder in partial remission, most recent episode unspecified type (CMS/HCC)        Goals    None

## 2023-06-23 ENCOUNTER — NURSING HOME VISIT (OUTPATIENT)
Dept: POST ACUTE CARE | Facility: EXTERNAL LOCATION | Age: 68
End: 2023-06-23
Payer: MEDICARE

## 2023-06-23 DIAGNOSIS — E78.5 HYPERLIPIDEMIA, UNSPECIFIED HYPERLIPIDEMIA TYPE: ICD-10-CM

## 2023-06-23 DIAGNOSIS — G89.4 CHRONIC PAIN SYNDROME: ICD-10-CM

## 2023-06-23 DIAGNOSIS — E66.01 MORBID OBESITY (MULTI): ICD-10-CM

## 2023-06-23 DIAGNOSIS — I10 HYPERTENSION, UNSPECIFIED TYPE: ICD-10-CM

## 2023-06-23 DIAGNOSIS — F31.70 BIPOLAR DISORDER IN PARTIAL REMISSION, MOST RECENT EPISODE UNSPECIFIED TYPE (MULTI): ICD-10-CM

## 2023-06-23 DIAGNOSIS — R53.81 DEBILITY: Primary | ICD-10-CM

## 2023-06-23 PROCEDURE — 99308 SBSQ NF CARE LOW MDM 20: CPT | Performed by: FAMILY MEDICINE

## 2023-06-23 NOTE — LETTER
Patient: Fanny Charles  : 1955    Encounter Date: 2023    Subjective  Patient ID: Fanny Charles is a 68 y.o. female who is long term resident being seen and evaluated for multiple medical problems.    Debility, depression, morbid obesity, hln, hld         Review of Systems   Reason unable to perform ROS: debility.   All other systems reviewed and are negative.      Objective  There were no vitals taken for this visit.    Physical Exam  Vitals and nursing note reviewed.   Constitutional:       Appearance: She is obese. She is ill-appearing.   HENT:      Head: Normocephalic and atraumatic.      Nose: Nose normal.      Mouth/Throat:      Mouth: Mucous membranes are moist.      Pharynx: Oropharynx is clear.   Eyes:      Extraocular Movements: Extraocular movements intact.      Conjunctiva/sclera: Conjunctivae normal.      Pupils: Pupils are equal, round, and reactive to light.   Cardiovascular:      Rate and Rhythm: Normal rate and regular rhythm.      Pulses: Normal pulses.   Pulmonary:      Effort: Pulmonary effort is normal.      Breath sounds: Normal breath sounds.   Abdominal:      General: Bowel sounds are normal.      Palpations: Abdomen is soft.   Musculoskeletal:         General: Tenderness present. Normal range of motion.      Cervical back: Normal range of motion and neck supple.   Skin:     General: Skin is warm and dry.      Capillary Refill: Capillary refill takes less than 2 seconds.      Coloration: Skin is pale.   Neurological:      General: No focal deficit present.      Mental Status: She is alert.      Motor: Weakness present.      Coordination: Coordination abnormal.      Gait: Gait abnormal.   Psychiatric:      Comments: flat         Assessment/Plan  Problem List Items Addressed This Visit          Nervous    Chronic pain       Circulatory    HTN (hypertension)       Endocrine/Metabolic    Morbid obesity (CMS/HCC)       Other    Debility - Primary    HLD (hyperlipidemia)    Bipolar  disorder in partial remission, most recent episode unspecified type (CMS/[facility])        Goals    None           Electronically Signed By: Jacqui Wesley MD   6/23/23 12:30 PM

## 2023-06-23 NOTE — PROGRESS NOTES
Subjective   Patient ID: Fanny Charles is a 68 y.o. female who is long term resident being seen and evaluated for multiple medical problems.    Debility, depression, morbid obesity, hln, hld         Review of Systems   Reason unable to perform ROS: debility.   All other systems reviewed and are negative.      Objective   There were no vitals taken for this visit.    Physical Exam  Vitals and nursing note reviewed.   Constitutional:       Appearance: She is obese. She is ill-appearing.   HENT:      Head: Normocephalic and atraumatic.      Nose: Nose normal.      Mouth/Throat:      Mouth: Mucous membranes are moist.      Pharynx: Oropharynx is clear.   Eyes:      Extraocular Movements: Extraocular movements intact.      Conjunctiva/sclera: Conjunctivae normal.      Pupils: Pupils are equal, round, and reactive to light.   Cardiovascular:      Rate and Rhythm: Normal rate and regular rhythm.      Pulses: Normal pulses.   Pulmonary:      Effort: Pulmonary effort is normal.      Breath sounds: Normal breath sounds.   Abdominal:      General: Bowel sounds are normal.      Palpations: Abdomen is soft.   Musculoskeletal:         General: Tenderness present. Normal range of motion.      Cervical back: Normal range of motion and neck supple.   Skin:     General: Skin is warm and dry.      Capillary Refill: Capillary refill takes less than 2 seconds.      Coloration: Skin is pale.   Neurological:      General: No focal deficit present.      Mental Status: She is alert.      Motor: Weakness present.      Coordination: Coordination abnormal.      Gait: Gait abnormal.   Psychiatric:      Comments: flat         Assessment/Plan   Problem List Items Addressed This Visit          Nervous    Chronic pain       Circulatory    HTN (hypertension)       Endocrine/Metabolic    Morbid obesity (CMS/HCC)       Other    Debility - Primary    HLD (hyperlipidemia)    Bipolar disorder in partial remission, most recent episode unspecified type  (CMS/HCC)        Goals    None

## 2023-07-10 ENCOUNTER — NURSING HOME VISIT (OUTPATIENT)
Dept: POST ACUTE CARE | Facility: EXTERNAL LOCATION | Age: 68
End: 2023-07-10
Payer: MEDICAID

## 2023-07-10 DIAGNOSIS — E66.01 MORBID OBESITY (MULTI): ICD-10-CM

## 2023-07-10 DIAGNOSIS — G89.4 CHRONIC PAIN SYNDROME: ICD-10-CM

## 2023-07-10 DIAGNOSIS — R53.81 DEBILITY: Primary | ICD-10-CM

## 2023-07-10 DIAGNOSIS — E78.5 HYPERLIPIDEMIA, UNSPECIFIED HYPERLIPIDEMIA TYPE: ICD-10-CM

## 2023-07-10 DIAGNOSIS — I10 HYPERTENSION, UNSPECIFIED TYPE: ICD-10-CM

## 2023-07-10 DIAGNOSIS — F31.70 BIPOLAR DISORDER IN PARTIAL REMISSION, MOST RECENT EPISODE UNSPECIFIED TYPE (MULTI): ICD-10-CM

## 2023-07-10 PROCEDURE — 99308 SBSQ NF CARE LOW MDM 20: CPT | Performed by: FAMILY MEDICINE

## 2023-07-10 NOTE — LETTER
Patient: Fanny Charles  : 1955    Encounter Date: 07/10/2023    Subjective  Patient ID: Fanny Charles is a 68 y.o. female who is long term resident being seen and evaluated for multiple medical problems.    Debility, bipolar, morbid obesity, hld, htrn         Review of Systems   Reason unable to perform ROS: debility.       Objective  There were no vitals taken for this visit.    Physical Exam  Vitals and nursing note reviewed.   Constitutional:       Appearance: She is obese. She is ill-appearing.   HENT:      Head: Normocephalic and atraumatic.      Nose: Nose normal.      Mouth/Throat:      Mouth: Mucous membranes are moist.      Pharynx: Oropharynx is clear.   Eyes:      Extraocular Movements: Extraocular movements intact.      Conjunctiva/sclera: Conjunctivae normal.      Pupils: Pupils are equal, round, and reactive to light.   Cardiovascular:      Rate and Rhythm: Normal rate and regular rhythm.      Pulses: Normal pulses.   Pulmonary:      Effort: Pulmonary effort is normal.      Breath sounds: Normal breath sounds.   Abdominal:      General: Bowel sounds are normal.      Palpations: Abdomen is soft.   Musculoskeletal:         General: Tenderness present. Normal range of motion.      Cervical back: Normal range of motion and neck supple.   Skin:     General: Skin is warm and dry.      Capillary Refill: Capillary refill takes less than 2 seconds.      Coloration: Skin is pale.   Neurological:      Mental Status: She is alert. Mental status is at baseline.      Motor: Weakness present.      Coordination: Coordination abnormal.      Gait: Gait abnormal.   Psychiatric:      Comments: flat         Assessment/Plan  Problem List Items Addressed This Visit          Cardiac and Vasculature    HTN (hypertension)    HLD (hyperlipidemia)       Endocrine/Metabolic    Morbid obesity (CMS/HCC)       Mental Health    Bipolar disorder in partial remission, most recent episode unspecified type (CMS/HCC)       Neuro     Chronic pain       Symptoms and Signs    Debility - Primary        Goals    None           Electronically Signed By: Jacqui Wesley MD   7/24/23  8:51 AM

## 2023-07-24 ENCOUNTER — NURSING HOME VISIT (OUTPATIENT)
Dept: POST ACUTE CARE | Facility: EXTERNAL LOCATION | Age: 68
End: 2023-07-24
Payer: MEDICAID

## 2023-07-24 DIAGNOSIS — I10 HYPERTENSION, UNSPECIFIED TYPE: ICD-10-CM

## 2023-07-24 DIAGNOSIS — R53.81 DEBILITY: Primary | ICD-10-CM

## 2023-07-24 DIAGNOSIS — E66.01 MORBID OBESITY (MULTI): ICD-10-CM

## 2023-07-24 DIAGNOSIS — E78.5 HYPERLIPIDEMIA, UNSPECIFIED HYPERLIPIDEMIA TYPE: ICD-10-CM

## 2023-07-24 DIAGNOSIS — G89.4 CHRONIC PAIN SYNDROME: ICD-10-CM

## 2023-07-24 DIAGNOSIS — F31.70 BIPOLAR DISORDER IN PARTIAL REMISSION, MOST RECENT EPISODE UNSPECIFIED TYPE (MULTI): ICD-10-CM

## 2023-07-24 PROCEDURE — 99308 SBSQ NF CARE LOW MDM 20: CPT | Performed by: FAMILY MEDICINE

## 2023-07-24 NOTE — LETTER
Patient: Fanny Charles  : 1955    Encounter Date: 2023    Subjective  Patient ID: Fanny Charles is a 68 y.o. female who is long term resident being seen and evaluated for multiple medical problems.    Debility, bipolar depression, htn, hld, chronic pain, morbid obesity         Review of Systems   Reason unable to perform ROS: debility.       Objective  There were no vitals taken for this visit.    Physical Exam  Vitals and nursing note reviewed.   Constitutional:       Appearance: She is obese. She is ill-appearing.   HENT:      Head: Normocephalic and atraumatic.      Nose: Nose normal.      Mouth/Throat:      Mouth: Mucous membranes are moist.      Pharynx: Oropharynx is clear.   Eyes:      Extraocular Movements: Extraocular movements intact.      Conjunctiva/sclera: Conjunctivae normal.      Pupils: Pupils are equal, round, and reactive to light.   Cardiovascular:      Rate and Rhythm: Normal rate and regular rhythm.      Pulses: Normal pulses.   Pulmonary:      Effort: Pulmonary effort is normal.      Breath sounds: Normal breath sounds.   Abdominal:      General: Bowel sounds are normal.      Palpations: Abdomen is soft.   Musculoskeletal:         General: Tenderness present. Normal range of motion.      Cervical back: Normal range of motion and neck supple.   Skin:     General: Skin is warm and dry.      Capillary Refill: Capillary refill takes less than 2 seconds.      Coloration: Skin is pale.   Neurological:      General: No focal deficit present.      Mental Status: She is alert. Mental status is at baseline.      Motor: Weakness present.      Coordination: Coordination abnormal.      Gait: Gait abnormal.      Deep Tendon Reflexes: Reflexes abnormal.   Psychiatric:      Comments: flat         Assessment/Plan  Problem List Items Addressed This Visit          Cardiac and Vasculature    HTN (hypertension)    HLD (hyperlipidemia)       Endocrine/Metabolic    Morbid obesity (CMS/HCC)       Mental  Health    Bipolar disorder in partial remission, most recent episode unspecified type (CMS/HCC)       Neuro    Chronic pain       Symptoms and Signs    Debility - Primary        Goals    None           Electronically Signed By: Jacqui Wesley MD   7/24/23  8:47 AM

## 2023-07-24 NOTE — PROGRESS NOTES
Subjective   Patient ID: Fanny Charles is a 68 y.o. female who is long term resident being seen and evaluated for multiple medical problems.    Debility, bipolar, morbid obesity, hld, htrn         Review of Systems   Reason unable to perform ROS: debility.       Objective   There were no vitals taken for this visit.    Physical Exam  Vitals and nursing note reviewed.   Constitutional:       Appearance: She is obese. She is ill-appearing.   HENT:      Head: Normocephalic and atraumatic.      Nose: Nose normal.      Mouth/Throat:      Mouth: Mucous membranes are moist.      Pharynx: Oropharynx is clear.   Eyes:      Extraocular Movements: Extraocular movements intact.      Conjunctiva/sclera: Conjunctivae normal.      Pupils: Pupils are equal, round, and reactive to light.   Cardiovascular:      Rate and Rhythm: Normal rate and regular rhythm.      Pulses: Normal pulses.   Pulmonary:      Effort: Pulmonary effort is normal.      Breath sounds: Normal breath sounds.   Abdominal:      General: Bowel sounds are normal.      Palpations: Abdomen is soft.   Musculoskeletal:         General: Tenderness present. Normal range of motion.      Cervical back: Normal range of motion and neck supple.   Skin:     General: Skin is warm and dry.      Capillary Refill: Capillary refill takes less than 2 seconds.      Coloration: Skin is pale.   Neurological:      Mental Status: She is alert. Mental status is at baseline.      Motor: Weakness present.      Coordination: Coordination abnormal.      Gait: Gait abnormal.   Psychiatric:      Comments: flat         Assessment/Plan   Problem List Items Addressed This Visit          Cardiac and Vasculature    HTN (hypertension)    HLD (hyperlipidemia)       Endocrine/Metabolic    Morbid obesity (CMS/HCC)       Mental Health    Bipolar disorder in partial remission, most recent episode unspecified type (CMS/HCC)       Neuro    Chronic pain       Symptoms and Signs    Debility - Primary         Goals    None

## 2023-07-24 NOTE — PROGRESS NOTES
Subjective   Patient ID: Fanny Charles is a 68 y.o. female who is long term resident being seen and evaluated for multiple medical problems.    Debility, bipolar depression, htn, hld, chronic pain, morbid obesity         Review of Systems   Reason unable to perform ROS: debility.       Objective   There were no vitals taken for this visit.    Physical Exam  Vitals and nursing note reviewed.   Constitutional:       Appearance: She is obese. She is ill-appearing.   HENT:      Head: Normocephalic and atraumatic.      Nose: Nose normal.      Mouth/Throat:      Mouth: Mucous membranes are moist.      Pharynx: Oropharynx is clear.   Eyes:      Extraocular Movements: Extraocular movements intact.      Conjunctiva/sclera: Conjunctivae normal.      Pupils: Pupils are equal, round, and reactive to light.   Cardiovascular:      Rate and Rhythm: Normal rate and regular rhythm.      Pulses: Normal pulses.   Pulmonary:      Effort: Pulmonary effort is normal.      Breath sounds: Normal breath sounds.   Abdominal:      General: Bowel sounds are normal.      Palpations: Abdomen is soft.   Musculoskeletal:         General: Tenderness present. Normal range of motion.      Cervical back: Normal range of motion and neck supple.   Skin:     General: Skin is warm and dry.      Capillary Refill: Capillary refill takes less than 2 seconds.      Coloration: Skin is pale.   Neurological:      General: No focal deficit present.      Mental Status: She is alert. Mental status is at baseline.      Motor: Weakness present.      Coordination: Coordination abnormal.      Gait: Gait abnormal.      Deep Tendon Reflexes: Reflexes abnormal.   Psychiatric:      Comments: flat         Assessment/Plan   Problem List Items Addressed This Visit          Cardiac and Vasculature    HTN (hypertension)    HLD (hyperlipidemia)       Endocrine/Metabolic    Morbid obesity (CMS/HCC)       Mental Health    Bipolar disorder in partial remission, most recent episode  unspecified type (CMS/HCC)       Neuro    Chronic pain       Symptoms and Signs    Debility - Primary        Goals    None

## 2023-08-04 ENCOUNTER — NURSING HOME VISIT (OUTPATIENT)
Dept: POST ACUTE CARE | Facility: EXTERNAL LOCATION | Age: 68
End: 2023-08-04
Payer: MEDICAID

## 2023-08-04 DIAGNOSIS — E66.01 MORBID OBESITY (MULTI): ICD-10-CM

## 2023-08-04 DIAGNOSIS — I10 HYPERTENSION, UNSPECIFIED TYPE: ICD-10-CM

## 2023-08-04 DIAGNOSIS — F31.70 BIPOLAR DISORDER IN PARTIAL REMISSION, MOST RECENT EPISODE UNSPECIFIED TYPE (MULTI): ICD-10-CM

## 2023-08-04 DIAGNOSIS — G89.4 CHRONIC PAIN SYNDROME: ICD-10-CM

## 2023-08-04 DIAGNOSIS — R53.81 DEBILITY: Primary | ICD-10-CM

## 2023-08-04 DIAGNOSIS — E78.5 HYPERLIPIDEMIA, UNSPECIFIED HYPERLIPIDEMIA TYPE: ICD-10-CM

## 2023-08-04 PROCEDURE — 99308 SBSQ NF CARE LOW MDM 20: CPT | Performed by: FAMILY MEDICINE

## 2023-08-04 NOTE — PROGRESS NOTES
Subjective   Patient ID: Fanny Charles is a 68 y.o. female who is long term resident being seen and evaluated for multiple medical problems.    Debility, obesity, bipolar depression, htn, hld,          Review of Systems   Reason unable to perform ROS: debility.       Objective   There were no vitals taken for this visit.    Physical Exam  Vitals and nursing note reviewed.   Constitutional:       Appearance: She is obese. She is ill-appearing.   HENT:      Head: Normocephalic and atraumatic.      Nose: Nose normal.      Mouth/Throat:      Mouth: Mucous membranes are moist.      Pharynx: Oropharynx is clear.   Eyes:      Extraocular Movements: Extraocular movements intact.      Conjunctiva/sclera: Conjunctivae normal.      Pupils: Pupils are equal, round, and reactive to light.   Cardiovascular:      Rate and Rhythm: Normal rate and regular rhythm.      Pulses: Normal pulses.   Pulmonary:      Effort: Pulmonary effort is normal.      Breath sounds: Normal breath sounds.   Abdominal:      General: Bowel sounds are normal.      Palpations: Abdomen is soft.   Musculoskeletal:         General: Tenderness present. Normal range of motion.      Cervical back: Normal range of motion and neck supple.   Skin:     General: Skin is warm and dry.      Capillary Refill: Capillary refill takes less than 2 seconds.      Coloration: Skin is pale.   Neurological:      Mental Status: She is alert. Mental status is at baseline.   Psychiatric:      Comments: flat         Assessment/Plan   Problem List Items Addressed This Visit          Cardiac and Vasculature    HTN (hypertension)    HLD (hyperlipidemia)       Endocrine/Metabolic    Morbid obesity (CMS/HCC)       Mental Health    Bipolar disorder in partial remission, most recent episode unspecified type (CMS/HCC)       Neuro    Chronic pain       Symptoms and Signs    Debility - Primary        Goals    None

## 2023-08-04 NOTE — LETTER
Patient: Fanny Charles  : 1955    Encounter Date: 2023    Subjective  Patient ID: Fanny Charles is a 68 y.o. female who is long term resident being seen and evaluated for multiple medical problems.    Debility, obesity, bipolar depression, htn, hld,          Review of Systems   Reason unable to perform ROS: debility.       Objective  There were no vitals taken for this visit.    Physical Exam  Vitals and nursing note reviewed.   Constitutional:       Appearance: She is obese. She is ill-appearing.   HENT:      Head: Normocephalic and atraumatic.      Nose: Nose normal.      Mouth/Throat:      Mouth: Mucous membranes are moist.      Pharynx: Oropharynx is clear.   Eyes:      Extraocular Movements: Extraocular movements intact.      Conjunctiva/sclera: Conjunctivae normal.      Pupils: Pupils are equal, round, and reactive to light.   Cardiovascular:      Rate and Rhythm: Normal rate and regular rhythm.      Pulses: Normal pulses.   Pulmonary:      Effort: Pulmonary effort is normal.      Breath sounds: Normal breath sounds.   Abdominal:      General: Bowel sounds are normal.      Palpations: Abdomen is soft.   Musculoskeletal:         General: Tenderness present. Normal range of motion.      Cervical back: Normal range of motion and neck supple.   Skin:     General: Skin is warm and dry.      Capillary Refill: Capillary refill takes less than 2 seconds.      Coloration: Skin is pale.   Neurological:      Mental Status: She is alert. Mental status is at baseline.   Psychiatric:      Comments: flat         Assessment/Plan  Problem List Items Addressed This Visit          Cardiac and Vasculature    HTN (hypertension)    HLD (hyperlipidemia)       Endocrine/Metabolic    Morbid obesity (CMS/HCC)       Mental Health    Bipolar disorder in partial remission, most recent episode unspecified type (CMS/HCC)       Neuro    Chronic pain       Symptoms and Signs    Debility - Primary        Goals    None            Electronically Signed By: Jacqui Wesley MD   8/4/23 11:51 AM

## 2023-08-14 ENCOUNTER — NURSING HOME VISIT (OUTPATIENT)
Dept: POST ACUTE CARE | Facility: EXTERNAL LOCATION | Age: 68
End: 2023-08-14
Payer: MEDICAID

## 2023-08-14 DIAGNOSIS — E66.01 MORBID OBESITY (MULTI): ICD-10-CM

## 2023-08-14 DIAGNOSIS — R53.81 DEBILITY: ICD-10-CM

## 2023-08-14 DIAGNOSIS — I10 HYPERTENSION, UNSPECIFIED TYPE: Primary | ICD-10-CM

## 2023-08-14 DIAGNOSIS — G89.4 CHRONIC PAIN SYNDROME: ICD-10-CM

## 2023-08-14 DIAGNOSIS — E78.5 HYPERLIPIDEMIA, UNSPECIFIED HYPERLIPIDEMIA TYPE: ICD-10-CM

## 2023-08-14 DIAGNOSIS — F31.70 BIPOLAR DISORDER IN PARTIAL REMISSION, MOST RECENT EPISODE UNSPECIFIED TYPE (MULTI): ICD-10-CM

## 2023-08-14 PROCEDURE — 99308 SBSQ NF CARE LOW MDM 20: CPT | Performed by: FAMILY MEDICINE

## 2023-08-14 NOTE — PROGRESS NOTES
Subjective   Patient ID: Fanny Charles is a 68 y.o. female who is long term resident being seen and evaluated for multiple medical problems.    Debility, bipolar depression, htn, hld, chronic pain    Htn,      Review of Systems   Reason unable to perform ROS: debility.       Objective   There were no vitals taken for this visit.    Physical Exam  Vitals and nursing note reviewed.   Constitutional:       Appearance: She is obese. She is ill-appearing.   HENT:      Head: Normocephalic and atraumatic.      Nose: Nose normal.      Mouth/Throat:      Mouth: Mucous membranes are moist.      Pharynx: Oropharynx is clear.   Eyes:      Extraocular Movements: Extraocular movements intact.      Conjunctiva/sclera: Conjunctivae normal.      Pupils: Pupils are equal, round, and reactive to light.   Cardiovascular:      Rate and Rhythm: Normal rate and regular rhythm.      Pulses: Normal pulses.   Pulmonary:      Effort: Pulmonary effort is normal.      Breath sounds: Normal breath sounds.   Abdominal:      General: Bowel sounds are normal.      Palpations: Abdomen is soft.   Musculoskeletal:         General: Tenderness present. Normal range of motion.      Cervical back: Normal range of motion and neck supple.   Skin:     General: Skin is warm and dry.      Capillary Refill: Capillary refill takes less than 2 seconds.      Coloration: Skin is pale.   Neurological:      Mental Status: Mental status is at baseline.      Motor: Weakness present.      Coordination: Coordination abnormal.      Gait: Gait abnormal.      Deep Tendon Reflexes: Reflexes abnormal.   Psychiatric:      Comments: flat         Assessment/Plan   Problem List Items Addressed This Visit          Cardiac and Vasculature    HTN (hypertension) - Primary    HLD (hyperlipidemia)       Endocrine/Metabolic    Morbid obesity (CMS/HCC)       Mental Health    Bipolar disorder in partial remission, most recent episode unspecified type (CMS/HCC)       Neuro    Chronic pain        Symptoms and Signs    Debility        Goals    None

## 2023-08-14 NOTE — LETTER
Patient: Fanny Charles  : 1955    Encounter Date: 2023    Subjective  Patient ID: Fanny Charles is a 68 y.o. female who is long term resident being seen and evaluated for multiple medical problems.    Debility, bipolar depression, htn, hld, chronic pain    Htn,      Review of Systems   Reason unable to perform ROS: debility.       Objective  There were no vitals taken for this visit.    Physical Exam  Vitals and nursing note reviewed.   Constitutional:       Appearance: She is obese. She is ill-appearing.   HENT:      Head: Normocephalic and atraumatic.      Nose: Nose normal.      Mouth/Throat:      Mouth: Mucous membranes are moist.      Pharynx: Oropharynx is clear.   Eyes:      Extraocular Movements: Extraocular movements intact.      Conjunctiva/sclera: Conjunctivae normal.      Pupils: Pupils are equal, round, and reactive to light.   Cardiovascular:      Rate and Rhythm: Normal rate and regular rhythm.      Pulses: Normal pulses.   Pulmonary:      Effort: Pulmonary effort is normal.      Breath sounds: Normal breath sounds.   Abdominal:      General: Bowel sounds are normal.      Palpations: Abdomen is soft.   Musculoskeletal:         General: Tenderness present. Normal range of motion.      Cervical back: Normal range of motion and neck supple.   Skin:     General: Skin is warm and dry.      Capillary Refill: Capillary refill takes less than 2 seconds.      Coloration: Skin is pale.   Neurological:      Mental Status: Mental status is at baseline.      Motor: Weakness present.      Coordination: Coordination abnormal.      Gait: Gait abnormal.      Deep Tendon Reflexes: Reflexes abnormal.   Psychiatric:      Comments: flat         Assessment/Plan  Problem List Items Addressed This Visit          Cardiac and Vasculature    HTN (hypertension) - Primary    HLD (hyperlipidemia)       Endocrine/Metabolic    Morbid obesity (CMS/HCC)       Mental Health    Bipolar disorder in partial remission, most  recent episode unspecified type (CMS/HCC)       Neuro    Chronic pain       Symptoms and Signs    Debility        Goals    None           Electronically Signed By: Jacqui Wesley MD   8/14/23 11:22 AM

## 2023-08-22 ENCOUNTER — NURSING HOME VISIT (OUTPATIENT)
Dept: PRIMARY CARE | Facility: CLINIC | Age: 68
End: 2023-08-22
Payer: MEDICAID

## 2023-08-22 DIAGNOSIS — R53.81 DEBILITY: Primary | ICD-10-CM

## 2023-08-22 DIAGNOSIS — I10 HYPERTENSION, UNSPECIFIED TYPE: ICD-10-CM

## 2023-08-22 DIAGNOSIS — G89.4 CHRONIC PAIN SYNDROME: ICD-10-CM

## 2023-08-22 DIAGNOSIS — E78.5 HYPERLIPIDEMIA, UNSPECIFIED HYPERLIPIDEMIA TYPE: ICD-10-CM

## 2023-08-22 DIAGNOSIS — E66.01 MORBID OBESITY (MULTI): ICD-10-CM

## 2023-08-22 DIAGNOSIS — F31.70 BIPOLAR DISORDER IN PARTIAL REMISSION, MOST RECENT EPISODE UNSPECIFIED TYPE (MULTI): ICD-10-CM

## 2023-08-22 PROCEDURE — 99308 SBSQ NF CARE LOW MDM 20: CPT | Performed by: FAMILY MEDICINE

## 2023-08-22 NOTE — PROGRESS NOTES
Subjective   Patient ID: Fanny Charles is a 68 y.o. female who presents for No chief complaint on file..    Debility, morbid obesity, htn, bipolar depression, hld, chronic pain         Review of Systems   Unable to perform ROS: Other (debility)   All other systems reviewed and are negative.      Objective   There were no vitals taken for this visit.    Physical Exam  Vitals and nursing note reviewed.   Constitutional:       Appearance: She is obese. She is ill-appearing.   HENT:      Head: Normocephalic and atraumatic.      Nose: Nose normal.      Mouth/Throat:      Mouth: Mucous membranes are moist.      Pharynx: Oropharynx is clear.   Eyes:      Extraocular Movements: Extraocular movements intact.      Conjunctiva/sclera: Conjunctivae normal.      Pupils: Pupils are equal, round, and reactive to light.   Cardiovascular:      Rate and Rhythm: Normal rate and regular rhythm.      Pulses: Normal pulses.   Pulmonary:      Effort: Pulmonary effort is normal.      Breath sounds: Normal breath sounds.   Abdominal:      General: Bowel sounds are normal.      Palpations: Abdomen is soft.   Musculoskeletal:         General: Tenderness present. Normal range of motion.      Cervical back: Normal range of motion and neck supple.   Skin:     General: Skin is warm and dry.      Capillary Refill: Capillary refill takes less than 2 seconds.      Coloration: Skin is pale.   Neurological:      Mental Status: She is alert. Mental status is at baseline.      Motor: Weakness present.      Coordination: Coordination abnormal.      Gait: Gait abnormal.   Psychiatric:      Comments: flat         Assessment/Plan   Problem List Items Addressed This Visit       Debility - Primary    HTN (hypertension)    Morbid obesity (CMS/HCC)    HLD (hyperlipidemia)    Chronic pain    Bipolar disorder in partial remission, most recent episode unspecified type (CMS/HCC)

## 2023-08-31 ENCOUNTER — NURSING HOME VISIT (OUTPATIENT)
Dept: PRIMARY CARE | Facility: CLINIC | Age: 68
End: 2023-08-31
Payer: MEDICAID

## 2023-08-31 DIAGNOSIS — E78.5 HYPERLIPIDEMIA, UNSPECIFIED HYPERLIPIDEMIA TYPE: ICD-10-CM

## 2023-08-31 DIAGNOSIS — I10 HYPERTENSION, UNSPECIFIED TYPE: ICD-10-CM

## 2023-08-31 DIAGNOSIS — R53.81 DEBILITY: Primary | ICD-10-CM

## 2023-08-31 DIAGNOSIS — G89.4 CHRONIC PAIN SYNDROME: ICD-10-CM

## 2023-08-31 DIAGNOSIS — F31.70 BIPOLAR DISORDER IN PARTIAL REMISSION, MOST RECENT EPISODE UNSPECIFIED TYPE (MULTI): ICD-10-CM

## 2023-08-31 DIAGNOSIS — E66.01 MORBID OBESITY (MULTI): ICD-10-CM

## 2023-08-31 PROCEDURE — 99308 SBSQ NF CARE LOW MDM 20: CPT | Performed by: FAMILY MEDICINE

## 2023-08-31 NOTE — PROGRESS NOTES
Subjective   Patient ID: Fanny Charles is a 68 y.o. female who is long term resident being seen and evaluated for multiple medical problems.    Debility, bipolar depression, htn, morbid obesity, htn, hld, chronic pain         Review of Systems   Reason unable to perform ROS: chronic pain.       Objective   There were no vitals taken for this visit.    Physical Exam  Vitals and nursing note reviewed.   Constitutional:       Appearance: She is obese. She is ill-appearing.   HENT:      Head: Normocephalic and atraumatic.      Nose: Nose normal.      Mouth/Throat:      Mouth: Mucous membranes are moist.      Pharynx: Oropharynx is clear.   Eyes:      Extraocular Movements: Extraocular movements intact.      Conjunctiva/sclera: Conjunctivae normal.      Pupils: Pupils are equal, round, and reactive to light.   Cardiovascular:      Rate and Rhythm: Normal rate and regular rhythm.      Pulses: Normal pulses.   Pulmonary:      Effort: Pulmonary effort is normal.      Breath sounds: Normal breath sounds.   Abdominal:      General: Bowel sounds are normal.      Palpations: Abdomen is soft.   Musculoskeletal:         General: Tenderness present. Normal range of motion.      Cervical back: Normal range of motion and neck supple.   Skin:     General: Skin is warm and dry.      Capillary Refill: Capillary refill takes less than 2 seconds.      Coloration: Skin is pale.   Neurological:      Mental Status: She is alert. Mental status is at baseline.      Motor: Weakness present.      Coordination: Coordination abnormal.      Gait: Gait abnormal.   Psychiatric:      Comments: sad         Assessment/Plan   Problem List Items Addressed This Visit       Debility - Primary    HTN (hypertension)    Morbid obesity (CMS/HCC)    HLD (hyperlipidemia)    Chronic pain    Bipolar disorder in partial remission, most recent episode unspecified type (CMS/HCC)        Goals    None

## 2023-09-13 ENCOUNTER — NURSING HOME VISIT (OUTPATIENT)
Dept: POST ACUTE CARE | Facility: EXTERNAL LOCATION | Age: 68
End: 2023-09-13
Payer: MEDICARE

## 2023-09-13 DIAGNOSIS — I10 HYPERTENSION, UNSPECIFIED TYPE: ICD-10-CM

## 2023-09-13 DIAGNOSIS — R53.81 DEBILITY: Primary | ICD-10-CM

## 2023-09-13 DIAGNOSIS — E78.5 HYPERLIPIDEMIA, UNSPECIFIED HYPERLIPIDEMIA TYPE: ICD-10-CM

## 2023-09-13 DIAGNOSIS — G89.4 CHRONIC PAIN SYNDROME: ICD-10-CM

## 2023-09-13 DIAGNOSIS — F31.70 BIPOLAR DISORDER IN PARTIAL REMISSION, MOST RECENT EPISODE UNSPECIFIED TYPE (MULTI): ICD-10-CM

## 2023-09-13 DIAGNOSIS — E66.01 MORBID OBESITY (MULTI): ICD-10-CM

## 2023-09-13 PROCEDURE — 99308 SBSQ NF CARE LOW MDM 20: CPT | Performed by: FAMILY MEDICINE

## 2023-09-13 NOTE — LETTER
Patient: Fanny Charles  : 1955    Encounter Date: 2023    Subjective  Patient ID: Fanny Charles is a 68 y.o. female who is long term resident being seen and evaluated for multiple medical problems.    Debility, bipolar depression, morbid obesity, htn, hld, chronic pain         Review of Systems   Reason unable to perform ROS: debility.       Objective  There were no vitals taken for this visit.    Physical Exam  Vitals and nursing note reviewed.   Constitutional:       Appearance: She is obese. She is ill-appearing.   HENT:      Head: Normocephalic and atraumatic.      Nose: Nose normal.      Mouth/Throat:      Mouth: Mucous membranes are moist.      Pharynx: Oropharynx is clear.   Eyes:      Extraocular Movements: Extraocular movements intact.      Conjunctiva/sclera: Conjunctivae normal.      Pupils: Pupils are equal, round, and reactive to light.   Cardiovascular:      Rate and Rhythm: Normal rate and regular rhythm.      Pulses: Normal pulses.   Pulmonary:      Effort: Pulmonary effort is normal.      Breath sounds: Normal breath sounds.   Abdominal:      General: Bowel sounds are normal.      Palpations: Abdomen is soft.   Musculoskeletal:         General: Tenderness present. Normal range of motion.      Cervical back: Normal range of motion and neck supple.   Skin:     General: Skin is warm and dry.      Capillary Refill: Capillary refill takes less than 2 seconds.      Coloration: Skin is pale.   Neurological:      Mental Status: She is alert. Mental status is at baseline.      Motor: Weakness present.      Coordination: Coordination abnormal.      Gait: Gait abnormal.   Psychiatric:      Comments: flat         Assessment/Plan  Problem List Items Addressed This Visit       Debility - Primary    HTN (hypertension)    Morbid obesity (CMS/HCC)    HLD (hyperlipidemia)    Chronic pain    Bipolar disorder in partial remission, most recent episode unspecified type (CMS/HCC)        Goals    None            Electronically Signed By: Jacqui Wesley MD   9/13/23 10:10 AM

## 2023-09-13 NOTE — PROGRESS NOTES
Subjective   Patient ID: Fanny Charles is a 68 y.o. female who is long term resident being seen and evaluated for multiple medical problems.    Debility, bipolar depression, morbid obesity, htn, hld, chronic pain         Review of Systems   Reason unable to perform ROS: debility.       Objective   There were no vitals taken for this visit.    Physical Exam  Vitals and nursing note reviewed.   Constitutional:       Appearance: She is obese. She is ill-appearing.   HENT:      Head: Normocephalic and atraumatic.      Nose: Nose normal.      Mouth/Throat:      Mouth: Mucous membranes are moist.      Pharynx: Oropharynx is clear.   Eyes:      Extraocular Movements: Extraocular movements intact.      Conjunctiva/sclera: Conjunctivae normal.      Pupils: Pupils are equal, round, and reactive to light.   Cardiovascular:      Rate and Rhythm: Normal rate and regular rhythm.      Pulses: Normal pulses.   Pulmonary:      Effort: Pulmonary effort is normal.      Breath sounds: Normal breath sounds.   Abdominal:      General: Bowel sounds are normal.      Palpations: Abdomen is soft.   Musculoskeletal:         General: Tenderness present. Normal range of motion.      Cervical back: Normal range of motion and neck supple.   Skin:     General: Skin is warm and dry.      Capillary Refill: Capillary refill takes less than 2 seconds.      Coloration: Skin is pale.   Neurological:      Mental Status: She is alert. Mental status is at baseline.      Motor: Weakness present.      Coordination: Coordination abnormal.      Gait: Gait abnormal.   Psychiatric:      Comments: flat         Assessment/Plan   Problem List Items Addressed This Visit       Debility - Primary    HTN (hypertension)    Morbid obesity (CMS/HCC)    HLD (hyperlipidemia)    Chronic pain    Bipolar disorder in partial remission, most recent episode unspecified type (CMS/HCC)        Goals    None

## 2023-09-22 ENCOUNTER — NURSING HOME VISIT (OUTPATIENT)
Dept: POST ACUTE CARE | Facility: EXTERNAL LOCATION | Age: 68
End: 2023-09-22
Payer: COMMERCIAL

## 2023-09-22 DIAGNOSIS — G89.4 CHRONIC PAIN SYNDROME: ICD-10-CM

## 2023-09-22 DIAGNOSIS — I10 HYPERTENSION, UNSPECIFIED TYPE: ICD-10-CM

## 2023-09-22 DIAGNOSIS — E66.01 MORBID OBESITY (MULTI): ICD-10-CM

## 2023-09-22 DIAGNOSIS — E78.5 HYPERLIPIDEMIA, UNSPECIFIED HYPERLIPIDEMIA TYPE: ICD-10-CM

## 2023-09-22 DIAGNOSIS — R53.81 DEBILITY: Primary | ICD-10-CM

## 2023-09-22 PROCEDURE — 99308 SBSQ NF CARE LOW MDM 20: CPT | Performed by: FAMILY MEDICINE

## 2023-09-22 NOTE — PROGRESS NOTES
Subjective   Patient ID: Fanny Charles is a 68 y.o. female who is long term resident being seen and evaluated for multiple medical problems.    Debility, morbid obesity, htn, hld, chronic pain         Review of Systems   Reason unable to perform ROS: debility.       Objective   There were no vitals taken for this visit.    Physical Exam  Vitals and nursing note reviewed.   Constitutional:       Appearance: She is obese. She is ill-appearing.   HENT:      Head: Normocephalic and atraumatic.      Nose: Nose normal.      Mouth/Throat:      Mouth: Mucous membranes are moist.      Pharynx: Oropharynx is clear.   Eyes:      Extraocular Movements: Extraocular movements intact.      Conjunctiva/sclera: Conjunctivae normal.      Pupils: Pupils are equal, round, and reactive to light.   Cardiovascular:      Rate and Rhythm: Normal rate and regular rhythm.      Pulses: Normal pulses.   Pulmonary:      Effort: Pulmonary effort is normal.      Breath sounds: Normal breath sounds.   Abdominal:      General: Bowel sounds are normal.      Palpations: Abdomen is soft.   Musculoskeletal:         General: Tenderness present. Normal range of motion.      Cervical back: Normal range of motion and neck supple.   Skin:     General: Skin is warm and dry.      Capillary Refill: Capillary refill takes less than 2 seconds.      Coloration: Skin is pale.   Neurological:      Mental Status: She is alert. Mental status is at baseline.      Motor: Weakness present.      Gait: Gait abnormal.   Psychiatric:         Mood and Affect: Mood normal.      Comments: flat         Assessment/Plan   Problem List Items Addressed This Visit       Debility - Primary    HTN (hypertension)    Morbid obesity (CMS/HCC)    HLD (hyperlipidemia)    Chronic pain        Goals    None

## 2023-09-22 NOTE — LETTER
Patient: Fanny Charles  : 1955    Encounter Date: 2023    Subjective  Patient ID: Fanny Charles is a 68 y.o. female who is long term resident being seen and evaluated for multiple medical problems.    Debility, morbid obesity, htn, hld, chronic pain         Review of Systems   Reason unable to perform ROS: debility.       Objective  There were no vitals taken for this visit.    Physical Exam  Vitals and nursing note reviewed.   Constitutional:       Appearance: She is obese. She is ill-appearing.   HENT:      Head: Normocephalic and atraumatic.      Nose: Nose normal.      Mouth/Throat:      Mouth: Mucous membranes are moist.      Pharynx: Oropharynx is clear.   Eyes:      Extraocular Movements: Extraocular movements intact.      Conjunctiva/sclera: Conjunctivae normal.      Pupils: Pupils are equal, round, and reactive to light.   Cardiovascular:      Rate and Rhythm: Normal rate and regular rhythm.      Pulses: Normal pulses.   Pulmonary:      Effort: Pulmonary effort is normal.      Breath sounds: Normal breath sounds.   Abdominal:      General: Bowel sounds are normal.      Palpations: Abdomen is soft.   Musculoskeletal:         General: Tenderness present. Normal range of motion.      Cervical back: Normal range of motion and neck supple.   Skin:     General: Skin is warm and dry.      Capillary Refill: Capillary refill takes less than 2 seconds.      Coloration: Skin is pale.   Neurological:      Mental Status: She is alert. Mental status is at baseline.      Motor: Weakness present.      Gait: Gait abnormal.   Psychiatric:         Mood and Affect: Mood normal.      Comments: flat         Assessment/Plan  Problem List Items Addressed This Visit       Debility - Primary    HTN (hypertension)    Morbid obesity (CMS/HCC)    HLD (hyperlipidemia)    Chronic pain        Goals    None           Electronically Signed By: Jacqui Wesley MD   23 10:36 AM

## 2023-10-02 ENCOUNTER — NURSING HOME VISIT (OUTPATIENT)
Dept: POST ACUTE CARE | Facility: EXTERNAL LOCATION | Age: 68
End: 2023-10-02
Payer: MEDICARE

## 2023-10-02 DIAGNOSIS — I10 HYPERTENSION, UNSPECIFIED TYPE: ICD-10-CM

## 2023-10-02 DIAGNOSIS — E78.5 HYPERLIPIDEMIA, UNSPECIFIED HYPERLIPIDEMIA TYPE: ICD-10-CM

## 2023-10-02 DIAGNOSIS — F31.70 BIPOLAR DISORDER IN PARTIAL REMISSION, MOST RECENT EPISODE UNSPECIFIED TYPE (MULTI): ICD-10-CM

## 2023-10-02 DIAGNOSIS — R53.81 DEBILITY: Primary | ICD-10-CM

## 2023-10-02 DIAGNOSIS — E66.01 MORBID OBESITY (MULTI): ICD-10-CM

## 2023-10-02 DIAGNOSIS — G89.4 CHRONIC PAIN SYNDROME: ICD-10-CM

## 2023-10-02 PROCEDURE — 99308 SBSQ NF CARE LOW MDM 20: CPT | Performed by: FAMILY MEDICINE

## 2023-10-02 NOTE — LETTER
Patient: Fanny Charles  : 1955    Encounter Date: 10/02/2023    Subjective  Patient ID: Fanny Charles is a 68 y.o. female who is long term resident being seen and evaluated for multiple medical problems.    Debility, morbid obesity, bipolar disorder, htn, chronic pain         Review of Systems   Reason unable to perform ROS: debility.       Objective  There were no vitals taken for this visit.    Physical Exam  Vitals and nursing note reviewed.   Constitutional:       Appearance: She is obese. She is ill-appearing.   HENT:      Head: Normocephalic and atraumatic.      Nose: Nose normal.      Mouth/Throat:      Mouth: Mucous membranes are moist.      Pharynx: Oropharynx is clear.   Eyes:      Extraocular Movements: Extraocular movements intact.      Conjunctiva/sclera: Conjunctivae normal.      Pupils: Pupils are equal, round, and reactive to light.   Cardiovascular:      Rate and Rhythm: Normal rate and regular rhythm.      Pulses: Normal pulses.   Pulmonary:      Effort: Pulmonary effort is normal.      Breath sounds: Normal breath sounds.   Abdominal:      General: Bowel sounds are normal.      Palpations: Abdomen is soft.   Musculoskeletal:         General: Tenderness present. Normal range of motion.      Cervical back: Normal range of motion and neck supple.   Skin:     General: Skin is warm and dry.      Capillary Refill: Capillary refill takes less than 2 seconds.      Coloration: Skin is pale.   Neurological:      Mental Status: She is alert. Mental status is at baseline.      Motor: Weakness present.      Coordination: Coordination abnormal.      Gait: Gait abnormal.      Deep Tendon Reflexes: Reflexes abnormal.   Psychiatric:         Mood and Affect: Mood normal.         Behavior: Behavior normal.         Thought Content: Thought content normal.         Judgment: Judgment normal.      Comments: flat         Assessment/Plan  Problem List Items Addressed This Visit             ICD-10-CM    Debility -  Primary R53.81    HTN (hypertension) I10    Morbid obesity (CMS/HCC) E66.01    HLD (hyperlipidemia) E78.5    Chronic pain G89.29    Bipolar disorder in partial remission, most recent episode unspecified type (CMS/[facility]) F31.70        Goals    None           Electronically Signed By: Jacqui Wesley MD   10/2/23  9:57 AM

## 2023-10-11 ENCOUNTER — NURSING HOME VISIT (OUTPATIENT)
Dept: POST ACUTE CARE | Facility: EXTERNAL LOCATION | Age: 68
End: 2023-10-11
Payer: MEDICARE

## 2023-10-11 DIAGNOSIS — E78.5 HYPERLIPIDEMIA, UNSPECIFIED HYPERLIPIDEMIA TYPE: ICD-10-CM

## 2023-10-11 DIAGNOSIS — R53.81 DEBILITY: Primary | ICD-10-CM

## 2023-10-11 DIAGNOSIS — G89.4 CHRONIC PAIN SYNDROME: ICD-10-CM

## 2023-10-11 DIAGNOSIS — F31.70 BIPOLAR DISORDER IN PARTIAL REMISSION, MOST RECENT EPISODE UNSPECIFIED TYPE (MULTI): ICD-10-CM

## 2023-10-11 DIAGNOSIS — I10 HYPERTENSION, UNSPECIFIED TYPE: ICD-10-CM

## 2023-10-11 DIAGNOSIS — E66.01 MORBID OBESITY (MULTI): ICD-10-CM

## 2023-10-11 PROCEDURE — 99308 SBSQ NF CARE LOW MDM 20: CPT | Performed by: FAMILY MEDICINE

## 2023-10-11 NOTE — LETTER
Patient: Fanny Charles  : 1955    Encounter Date: 10/11/2023    Subjective  Patient ID: Fanny Charles is a 68 y.o. female who is long term resident being seen and evaluated for multiple medical problems.    Debility, bipolar disorder, htn, morbid obesity, debility, hld, chronic pain         Review of Systems   Reason unable to perform ROS: debility.       Objective  There were no vitals taken for this visit.    Physical Exam  Vitals and nursing note reviewed.   Constitutional:       Appearance: She is obese. She is ill-appearing.   HENT:      Head: Normocephalic and atraumatic.      Nose: Nose normal.      Mouth/Throat:      Mouth: Mucous membranes are moist.      Pharynx: Oropharynx is clear.   Eyes:      Extraocular Movements: Extraocular movements intact.      Conjunctiva/sclera: Conjunctivae normal.      Pupils: Pupils are equal, round, and reactive to light.   Cardiovascular:      Rate and Rhythm: Normal rate and regular rhythm.      Pulses: Normal pulses.   Pulmonary:      Effort: Pulmonary effort is normal.      Breath sounds: Normal breath sounds.   Abdominal:      General: Bowel sounds are normal.      Palpations: Abdomen is soft.   Musculoskeletal:         General: Tenderness present. Normal range of motion.      Cervical back: Normal range of motion and neck supple.   Skin:     General: Skin is warm and dry.      Capillary Refill: Capillary refill takes less than 2 seconds.      Coloration: Skin is pale.   Neurological:      Mental Status: She is alert. Mental status is at baseline.      Motor: Weakness present.      Gait: Gait abnormal.   Psychiatric:         Mood and Affect: Affect is flat.         Assessment/Plan  Problem List Items Addressed This Visit             ICD-10-CM    Debility - Primary R53.81    HTN (hypertension) I10    Morbid obesity (CMS/HCC) E66.01    HLD (hyperlipidemia) E78.5    Chronic pain G89.29    Bipolar disorder in partial remission, most recent episode unspecified type  (CMS/[facility]) F31.70        Goals    None           Electronically Signed By: Jacqui Wesley MD   10/11/23 11:24 AM

## 2023-10-11 NOTE — PROGRESS NOTES
Subjective   Patient ID: Fanny Charles is a 68 y.o. female who is long term resident being seen and evaluated for multiple medical problems.    Debility, bipolar disorder, htn, morbid obesity, debility, hld, chronic pain         Review of Systems   Reason unable to perform ROS: debility.       Objective   There were no vitals taken for this visit.    Physical Exam  Vitals and nursing note reviewed.   Constitutional:       Appearance: She is obese. She is ill-appearing.   HENT:      Head: Normocephalic and atraumatic.      Nose: Nose normal.      Mouth/Throat:      Mouth: Mucous membranes are moist.      Pharynx: Oropharynx is clear.   Eyes:      Extraocular Movements: Extraocular movements intact.      Conjunctiva/sclera: Conjunctivae normal.      Pupils: Pupils are equal, round, and reactive to light.   Cardiovascular:      Rate and Rhythm: Normal rate and regular rhythm.      Pulses: Normal pulses.   Pulmonary:      Effort: Pulmonary effort is normal.      Breath sounds: Normal breath sounds.   Abdominal:      General: Bowel sounds are normal.      Palpations: Abdomen is soft.   Musculoskeletal:         General: Tenderness present. Normal range of motion.      Cervical back: Normal range of motion and neck supple.   Skin:     General: Skin is warm and dry.      Capillary Refill: Capillary refill takes less than 2 seconds.      Coloration: Skin is pale.   Neurological:      Mental Status: She is alert. Mental status is at baseline.      Motor: Weakness present.      Gait: Gait abnormal.   Psychiatric:         Mood and Affect: Affect is flat.         Assessment/Plan   Problem List Items Addressed This Visit             ICD-10-CM    Debility - Primary R53.81    HTN (hypertension) I10    Morbid obesity (CMS/HCC) E66.01    HLD (hyperlipidemia) E78.5    Chronic pain G89.29    Bipolar disorder in partial remission, most recent episode unspecified type (CMS/HCC) F31.70        Goals    None

## 2023-10-30 ENCOUNTER — NURSING HOME VISIT (OUTPATIENT)
Dept: POST ACUTE CARE | Facility: EXTERNAL LOCATION | Age: 68
End: 2023-10-30
Payer: MEDICARE

## 2023-10-30 DIAGNOSIS — E78.5 HYPERLIPIDEMIA, UNSPECIFIED HYPERLIPIDEMIA TYPE: ICD-10-CM

## 2023-10-30 DIAGNOSIS — G89.4 CHRONIC PAIN SYNDROME: ICD-10-CM

## 2023-10-30 DIAGNOSIS — I10 HYPERTENSION, UNSPECIFIED TYPE: ICD-10-CM

## 2023-10-30 DIAGNOSIS — E66.01 MORBID OBESITY (MULTI): ICD-10-CM

## 2023-10-30 DIAGNOSIS — R53.81 DEBILITY: Primary | ICD-10-CM

## 2023-10-30 DIAGNOSIS — F31.70 BIPOLAR DISORDER IN PARTIAL REMISSION, MOST RECENT EPISODE UNSPECIFIED TYPE (MULTI): ICD-10-CM

## 2023-10-30 PROCEDURE — 99308 SBSQ NF CARE LOW MDM 20: CPT | Performed by: FAMILY MEDICINE

## 2023-10-31 NOTE — PROGRESS NOTES
Subjective   Patient ID: Fanny Charles is a 68 y.o. female who is long term resident being seen and evaluated for multiple medical problems.    Bipolar, debility, morbid obesity, htn, hld, chronic pain,          Review of Systems   Reason unable to perform ROS: debility.       Objective   There were no vitals taken for this visit.    Physical Exam  Vitals and nursing note reviewed.   Constitutional:       Appearance: She is obese. She is ill-appearing.   HENT:      Head: Normocephalic and atraumatic.      Nose: Nose normal.      Mouth/Throat:      Mouth: Mucous membranes are moist.      Pharynx: Oropharynx is clear.   Eyes:      Extraocular Movements: Extraocular movements intact.      Conjunctiva/sclera: Conjunctivae normal.      Pupils: Pupils are equal, round, and reactive to light.   Cardiovascular:      Rate and Rhythm: Normal rate and regular rhythm.      Pulses: Normal pulses.   Pulmonary:      Effort: Pulmonary effort is normal.      Breath sounds: Normal breath sounds.   Abdominal:      General: Bowel sounds are normal.      Palpations: Abdomen is soft.   Musculoskeletal:         General: Tenderness present. Normal range of motion.      Cervical back: Normal range of motion and neck supple.   Skin:     General: Skin is warm and dry.      Capillary Refill: Capillary refill takes less than 2 seconds.      Coloration: Skin is pale.   Neurological:      Mental Status: She is alert. Mental status is at baseline.      Motor: Weakness present.      Gait: Gait abnormal.   Psychiatric:         Mood and Affect: Affect is flat.         Assessment/Plan   Problem List Items Addressed This Visit             ICD-10-CM    Debility - Primary R53.81    HTN (hypertension) I10    Morbid obesity (CMS/HCC) E66.01    HLD (hyperlipidemia) E78.5    Chronic pain G89.29    Bipolar disorder in partial remission, most recent episode unspecified type (CMS/HCC) F31.70        Goals    None

## 2023-11-08 ENCOUNTER — NURSING HOME VISIT (OUTPATIENT)
Dept: POST ACUTE CARE | Facility: EXTERNAL LOCATION | Age: 68
End: 2023-11-08
Payer: MEDICARE

## 2023-11-08 DIAGNOSIS — F31.70 BIPOLAR DISORDER IN PARTIAL REMISSION, MOST RECENT EPISODE UNSPECIFIED TYPE (MULTI): ICD-10-CM

## 2023-11-08 DIAGNOSIS — E66.01 MORBID OBESITY (MULTI): ICD-10-CM

## 2023-11-08 DIAGNOSIS — E78.5 HYPERLIPIDEMIA, UNSPECIFIED HYPERLIPIDEMIA TYPE: ICD-10-CM

## 2023-11-08 DIAGNOSIS — I10 HYPERTENSION, UNSPECIFIED TYPE: ICD-10-CM

## 2023-11-08 DIAGNOSIS — G89.4 CHRONIC PAIN SYNDROME: ICD-10-CM

## 2023-11-08 DIAGNOSIS — R53.81 DEBILITY: Primary | ICD-10-CM

## 2023-11-08 PROCEDURE — 99308 SBSQ NF CARE LOW MDM 20: CPT | Performed by: FAMILY MEDICINE

## 2023-11-08 NOTE — PROGRESS NOTES
Subjective   Patient ID: Fanny Charles is a 68 y.o. female who is long term resident being seen and evaluated for multiple medical problems.    Debility, htn. Hld, bipolar disorder, morbid obesity         Review of Systems   Reason unable to perform ROS: debility.       Objective   There were no vitals taken for this visit.    Physical Exam  Vitals and nursing note reviewed.   Constitutional:       Appearance: She is obese. She is ill-appearing.   HENT:      Head: Normocephalic and atraumatic.      Nose: Nose normal.      Mouth/Throat:      Mouth: Mucous membranes are moist.      Pharynx: Oropharynx is clear.   Eyes:      Extraocular Movements: Extraocular movements intact.      Conjunctiva/sclera: Conjunctivae normal.      Pupils: Pupils are equal, round, and reactive to light.   Cardiovascular:      Rate and Rhythm: Normal rate and regular rhythm.      Pulses: Normal pulses.   Pulmonary:      Effort: Pulmonary effort is normal.      Breath sounds: Normal breath sounds.   Abdominal:      General: Bowel sounds are normal.      Palpations: Abdomen is soft.   Musculoskeletal:         General: Tenderness present. Normal range of motion.      Cervical back: Normal range of motion and neck supple.   Skin:     General: Skin is warm and dry.      Capillary Refill: Capillary refill takes less than 2 seconds.      Coloration: Skin is pale.   Neurological:      General: No focal deficit present.      Mental Status: She is alert. Mental status is at baseline.      Motor: Weakness present.      Gait: Gait abnormal.      Deep Tendon Reflexes: Reflexes normal.   Psychiatric:         Mood and Affect: Affect is labile.         Assessment/Plan   Problem List Items Addressed This Visit             ICD-10-CM    Debility - Primary R53.81    HTN (hypertension) I10    Morbid obesity (CMS/HCC) E66.01    HLD (hyperlipidemia) E78.5    Chronic pain G89.29    Bipolar disorder in partial remission, most recent episode unspecified type  (CMS/HCC) F31.70        Goals    None

## 2023-11-08 NOTE — LETTER
Patient: Fanny Charles  : 1955    Encounter Date: 2023    Subjective  Patient ID: Fanny Charles is a 68 y.o. female who is long term resident being seen and evaluated for multiple medical problems.    Debility, htn. Hld, bipolar disorder, morbid obesity         Review of Systems   Reason unable to perform ROS: debility.       Objective  There were no vitals taken for this visit.    Physical Exam  Vitals and nursing note reviewed.   Constitutional:       Appearance: She is obese. She is ill-appearing.   HENT:      Head: Normocephalic and atraumatic.      Nose: Nose normal.      Mouth/Throat:      Mouth: Mucous membranes are moist.      Pharynx: Oropharynx is clear.   Eyes:      Extraocular Movements: Extraocular movements intact.      Conjunctiva/sclera: Conjunctivae normal.      Pupils: Pupils are equal, round, and reactive to light.   Cardiovascular:      Rate and Rhythm: Normal rate and regular rhythm.      Pulses: Normal pulses.   Pulmonary:      Effort: Pulmonary effort is normal.      Breath sounds: Normal breath sounds.   Abdominal:      General: Bowel sounds are normal.      Palpations: Abdomen is soft.   Musculoskeletal:         General: Tenderness present. Normal range of motion.      Cervical back: Normal range of motion and neck supple.   Skin:     General: Skin is warm and dry.      Capillary Refill: Capillary refill takes less than 2 seconds.      Coloration: Skin is pale.   Neurological:      General: No focal deficit present.      Mental Status: She is alert. Mental status is at baseline.      Motor: Weakness present.      Gait: Gait abnormal.      Deep Tendon Reflexes: Reflexes normal.   Psychiatric:         Mood and Affect: Affect is labile.         Assessment/Plan  Problem List Items Addressed This Visit             ICD-10-CM    Debility - Primary R53.81    HTN (hypertension) I10    Morbid obesity (CMS/HCC) E66.01    HLD (hyperlipidemia) E78.5    Chronic pain G89.29    Bipolar disorder  in partial remission, most recent episode unspecified type (CMS/[facility]) F31.70        Goals    None           Electronically Signed By: Jacqui Wesley MD   11/8/23 10:30 AM

## 2023-11-21 ENCOUNTER — NURSING HOME VISIT (OUTPATIENT)
Dept: POST ACUTE CARE | Facility: EXTERNAL LOCATION | Age: 68
End: 2023-11-21
Payer: COMMERCIAL

## 2023-11-21 DIAGNOSIS — R53.81 DEBILITY: Primary | ICD-10-CM

## 2023-11-21 DIAGNOSIS — F31.70 BIPOLAR DISORDER IN PARTIAL REMISSION, MOST RECENT EPISODE UNSPECIFIED TYPE (MULTI): ICD-10-CM

## 2023-11-21 DIAGNOSIS — E78.5 HYPERLIPIDEMIA, UNSPECIFIED HYPERLIPIDEMIA TYPE: ICD-10-CM

## 2023-11-21 DIAGNOSIS — I10 HYPERTENSION, UNSPECIFIED TYPE: ICD-10-CM

## 2023-11-21 DIAGNOSIS — E66.01 MORBID OBESITY (MULTI): ICD-10-CM

## 2023-11-21 DIAGNOSIS — G89.4 CHRONIC PAIN SYNDROME: ICD-10-CM

## 2023-11-21 PROCEDURE — 99308 SBSQ NF CARE LOW MDM 20: CPT | Performed by: FAMILY MEDICINE

## 2023-11-21 NOTE — PROGRESS NOTES
Subjective   Patient ID: Fanny Charles is a 68 y.o. female who is long term resident being seen and evaluated for multiple medical problems.    Debility, bipolar depression, htn, morbid obesity, hld, chronic pain.         Review of Systems   Reason unable to perform ROS: debility.       Objective   There were no vitals taken for this visit.    Physical Exam  Vitals and nursing note reviewed.   Constitutional:       Appearance: She is obese. She is ill-appearing.   HENT:      Head: Normocephalic and atraumatic.      Nose: Nose normal.      Mouth/Throat:      Mouth: Mucous membranes are moist.      Pharynx: Oropharynx is clear.   Eyes:      Extraocular Movements: Extraocular movements intact.      Conjunctiva/sclera: Conjunctivae normal.      Pupils: Pupils are equal, round, and reactive to light.   Cardiovascular:      Rate and Rhythm: Normal rate and regular rhythm.      Pulses: Normal pulses.   Pulmonary:      Effort: Pulmonary effort is normal.      Breath sounds: Normal breath sounds.   Abdominal:      General: Bowel sounds are normal.      Palpations: Abdomen is soft.   Musculoskeletal:         General: Tenderness present. Normal range of motion.      Cervical back: Normal range of motion and neck supple.   Skin:     General: Skin is warm and dry.      Capillary Refill: Capillary refill takes less than 2 seconds.      Coloration: Skin is pale.   Neurological:      General: No focal deficit present.      Mental Status: She is alert. Mental status is at baseline.      Motor: Weakness present.      Gait: Gait abnormal.   Psychiatric:         Mood and Affect: Mood is depressed.         Assessment/Plan   Problem List Items Addressed This Visit             ICD-10-CM    Debility - Primary R53.81    HTN (hypertension) I10    Morbid obesity (CMS/HCC) E66.01    HLD (hyperlipidemia) E78.5    Chronic pain G89.29    Bipolar disorder in partial remission, most recent episode unspecified type (CMS/HCC) F31.70        Goals     None

## 2023-11-21 NOTE — LETTER
Patient: Fanny Charles  : 1955    Encounter Date: 2023    Subjective  Patient ID: Fanny Charles is a 68 y.o. female who is long term resident being seen and evaluated for multiple medical problems.    Debility, bipolar depression, htn, morbid obesity, hld, chronic pain.         Review of Systems   Reason unable to perform ROS: debility.       Objective  There were no vitals taken for this visit.    Physical Exam  Vitals and nursing note reviewed.   Constitutional:       Appearance: She is obese. She is ill-appearing.   HENT:      Head: Normocephalic and atraumatic.      Nose: Nose normal.      Mouth/Throat:      Mouth: Mucous membranes are moist.      Pharynx: Oropharynx is clear.   Eyes:      Extraocular Movements: Extraocular movements intact.      Conjunctiva/sclera: Conjunctivae normal.      Pupils: Pupils are equal, round, and reactive to light.   Cardiovascular:      Rate and Rhythm: Normal rate and regular rhythm.      Pulses: Normal pulses.   Pulmonary:      Effort: Pulmonary effort is normal.      Breath sounds: Normal breath sounds.   Abdominal:      General: Bowel sounds are normal.      Palpations: Abdomen is soft.   Musculoskeletal:         General: Tenderness present. Normal range of motion.      Cervical back: Normal range of motion and neck supple.   Skin:     General: Skin is warm and dry.      Capillary Refill: Capillary refill takes less than 2 seconds.      Coloration: Skin is pale.   Neurological:      General: No focal deficit present.      Mental Status: She is alert. Mental status is at baseline.      Motor: Weakness present.      Gait: Gait abnormal.   Psychiatric:         Mood and Affect: Mood is depressed.         Assessment/Plan  Problem List Items Addressed This Visit             ICD-10-CM    Debility - Primary R53.81    HTN (hypertension) I10    Morbid obesity (CMS/HCC) E66.01    HLD (hyperlipidemia) E78.5    Chronic pain G89.29    Bipolar disorder in partial remission,  most recent episode unspecified type (CMS/HCC) F31.70        Goals    None           Electronically Signed By: Jacqui Wesley MD   11/21/23  1:36 PM

## 2023-11-30 ENCOUNTER — NURSING HOME VISIT (OUTPATIENT)
Dept: POST ACUTE CARE | Facility: EXTERNAL LOCATION | Age: 68
End: 2023-11-30
Payer: MEDICARE

## 2023-11-30 DIAGNOSIS — I10 HYPERTENSION, UNSPECIFIED TYPE: ICD-10-CM

## 2023-11-30 DIAGNOSIS — F31.70 BIPOLAR DISORDER IN PARTIAL REMISSION, MOST RECENT EPISODE UNSPECIFIED TYPE (MULTI): ICD-10-CM

## 2023-11-30 DIAGNOSIS — E78.5 HYPERLIPIDEMIA, UNSPECIFIED HYPERLIPIDEMIA TYPE: ICD-10-CM

## 2023-11-30 DIAGNOSIS — E66.01 MORBID OBESITY (MULTI): ICD-10-CM

## 2023-11-30 DIAGNOSIS — R53.81 DEBILITY: Primary | ICD-10-CM

## 2023-11-30 DIAGNOSIS — G89.4 CHRONIC PAIN SYNDROME: ICD-10-CM

## 2023-11-30 PROCEDURE — 99308 SBSQ NF CARE LOW MDM 20: CPT | Performed by: FAMILY MEDICINE

## 2023-11-30 NOTE — LETTER
Patient: Fanny Charles  : 1955    Encounter Date: 2023    Subjective  Patient ID: Fanny Charles is a 68 y.o. female who is long term resident being seen and evaluated for multiple medical problems.    Debility, bipolar depression, htn, hld, chronic pain         Review of Systems   Reason unable to perform ROS: debility.       Objective  There were no vitals taken for this visit.    Physical Exam  Vitals and nursing note reviewed.   Constitutional:       Appearance: She is obese. She is ill-appearing.   HENT:      Head: Normocephalic and atraumatic.      Nose: Nose normal.      Mouth/Throat:      Mouth: Mucous membranes are moist.      Pharynx: Oropharynx is clear.   Eyes:      Extraocular Movements: Extraocular movements intact.      Conjunctiva/sclera: Conjunctivae normal.      Pupils: Pupils are equal, round, and reactive to light.   Cardiovascular:      Rate and Rhythm: Normal rate and regular rhythm.      Pulses: Normal pulses.   Pulmonary:      Effort: Pulmonary effort is normal.      Breath sounds: Normal breath sounds.   Abdominal:      General: Bowel sounds are normal.      Palpations: Abdomen is soft.   Musculoskeletal:         General: Tenderness present. Normal range of motion.      Cervical back: Normal range of motion and neck supple.   Skin:     General: Skin is warm and dry.      Capillary Refill: Capillary refill takes less than 2 seconds.      Coloration: Skin is pale.   Neurological:      Mental Status: She is alert.      Motor: Weakness present.      Gait: Gait abnormal.   Psychiatric:         Mood and Affect: Affect is flat.         Assessment/Plan  Problem List Items Addressed This Visit             ICD-10-CM    Debility - Primary R53.81    HTN (hypertension) I10    Morbid obesity (CMS/HCC) E66.01    HLD (hyperlipidemia) E78.5    Chronic pain G89.29    Bipolar disorder in partial remission, most recent episode unspecified type (CMS/HCC) F31.70        Goals    None            Electronically Signed By: Jacqui Wesley MD   12/4/23  8:54 AM

## 2023-12-04 NOTE — PROGRESS NOTES
Subjective   Patient ID: Fanny Charles is a 68 y.o. female who is long term resident being seen and evaluated for multiple medical problems.    Debility, bipolar depression, htn, hld, chronic pain         Review of Systems   Reason unable to perform ROS: debility.       Objective   There were no vitals taken for this visit.    Physical Exam  Vitals and nursing note reviewed.   Constitutional:       Appearance: She is obese. She is ill-appearing.   HENT:      Head: Normocephalic and atraumatic.      Nose: Nose normal.      Mouth/Throat:      Mouth: Mucous membranes are moist.      Pharynx: Oropharynx is clear.   Eyes:      Extraocular Movements: Extraocular movements intact.      Conjunctiva/sclera: Conjunctivae normal.      Pupils: Pupils are equal, round, and reactive to light.   Cardiovascular:      Rate and Rhythm: Normal rate and regular rhythm.      Pulses: Normal pulses.   Pulmonary:      Effort: Pulmonary effort is normal.      Breath sounds: Normal breath sounds.   Abdominal:      General: Bowel sounds are normal.      Palpations: Abdomen is soft.   Musculoskeletal:         General: Tenderness present. Normal range of motion.      Cervical back: Normal range of motion and neck supple.   Skin:     General: Skin is warm and dry.      Capillary Refill: Capillary refill takes less than 2 seconds.      Coloration: Skin is pale.   Neurological:      Mental Status: She is alert.      Motor: Weakness present.      Gait: Gait abnormal.   Psychiatric:         Mood and Affect: Affect is flat.         Assessment/Plan   Problem List Items Addressed This Visit             ICD-10-CM    Debility - Primary R53.81    HTN (hypertension) I10    Morbid obesity (CMS/HCC) E66.01    HLD (hyperlipidemia) E78.5    Chronic pain G89.29    Bipolar disorder in partial remission, most recent episode unspecified type (CMS/HCC) F31.70        Goals    None

## 2023-12-19 ENCOUNTER — NURSING HOME VISIT (OUTPATIENT)
Dept: POST ACUTE CARE | Facility: EXTERNAL LOCATION | Age: 68
End: 2023-12-19
Payer: MEDICARE

## 2023-12-19 DIAGNOSIS — I10 HYPERTENSION, UNSPECIFIED TYPE: ICD-10-CM

## 2023-12-19 DIAGNOSIS — G89.4 CHRONIC PAIN SYNDROME: ICD-10-CM

## 2023-12-19 DIAGNOSIS — R53.81 DEBILITY: Primary | ICD-10-CM

## 2023-12-19 DIAGNOSIS — F31.70 BIPOLAR DISORDER IN PARTIAL REMISSION, MOST RECENT EPISODE UNSPECIFIED TYPE (MULTI): ICD-10-CM

## 2023-12-19 DIAGNOSIS — E66.01 MORBID OBESITY (MULTI): ICD-10-CM

## 2023-12-19 DIAGNOSIS — E78.5 HYPERLIPIDEMIA, UNSPECIFIED HYPERLIPIDEMIA TYPE: ICD-10-CM

## 2023-12-19 PROCEDURE — 99308 SBSQ NF CARE LOW MDM 20: CPT | Performed by: FAMILY MEDICINE

## 2023-12-19 NOTE — LETTER
Patient: Fanny Charles  : 1955    Encounter Date: 2023    Subjective  Patient ID: Fanny Charles is a 68 y.o. female who is long term resident being seen and evaluated for multiple medical problems.    Bipolar depression, morbid obesity, debility, htn, hld, chtronic pain         Review of Systems   Reason unable to perform ROS: debility.       Objective  There were no vitals taken for this visit.    Physical Exam  Vitals and nursing note reviewed.   Constitutional:       Appearance: She is obese. She is ill-appearing.   HENT:      Head: Normocephalic and atraumatic.      Nose: Nose normal.      Mouth/Throat:      Mouth: Mucous membranes are moist.      Pharynx: Oropharynx is clear.   Eyes:      Extraocular Movements: Extraocular movements intact.      Conjunctiva/sclera: Conjunctivae normal.      Pupils: Pupils are equal, round, and reactive to light.   Cardiovascular:      Rate and Rhythm: Normal rate and regular rhythm.      Pulses: Normal pulses.   Pulmonary:      Effort: Pulmonary effort is normal.      Breath sounds: Normal breath sounds.   Abdominal:      General: Bowel sounds are normal.      Palpations: Abdomen is soft.   Musculoskeletal:         General: Tenderness present. Normal range of motion.      Cervical back: Normal range of motion and neck supple.   Skin:     General: Skin is warm and dry.      Capillary Refill: Capillary refill takes less than 2 seconds.      Coloration: Skin is pale.   Neurological:      General: No focal deficit present.      Mental Status: She is alert. Mental status is at baseline.   Psychiatric:         Mood and Affect: Affect is labile.         Assessment/Plan  Problem List Items Addressed This Visit             ICD-10-CM    Debility - Primary R53.81    HTN (hypertension) I10    Morbid obesity (CMS/HCC) E66.01    HLD (hyperlipidemia) E78.5    Chronic pain G89.29    Bipolar disorder in partial remission, most recent episode unspecified type (CMS/HCC) F31.70         Goals    None           Electronically Signed By: Jacqui Wesley MD   12/21/23  3:10 PM

## 2023-12-21 NOTE — PROGRESS NOTES
Subjective   Patient ID: Fanny Charlse is a 68 y.o. female who is long term resident being seen and evaluated for multiple medical problems.    Bipolar depression, morbid obesity, debility, htn, hld, chtronic pain         Review of Systems   Reason unable to perform ROS: debility.       Objective   There were no vitals taken for this visit.    Physical Exam  Vitals and nursing note reviewed.   Constitutional:       Appearance: She is obese. She is ill-appearing.   HENT:      Head: Normocephalic and atraumatic.      Nose: Nose normal.      Mouth/Throat:      Mouth: Mucous membranes are moist.      Pharynx: Oropharynx is clear.   Eyes:      Extraocular Movements: Extraocular movements intact.      Conjunctiva/sclera: Conjunctivae normal.      Pupils: Pupils are equal, round, and reactive to light.   Cardiovascular:      Rate and Rhythm: Normal rate and regular rhythm.      Pulses: Normal pulses.   Pulmonary:      Effort: Pulmonary effort is normal.      Breath sounds: Normal breath sounds.   Abdominal:      General: Bowel sounds are normal.      Palpations: Abdomen is soft.   Musculoskeletal:         General: Tenderness present. Normal range of motion.      Cervical back: Normal range of motion and neck supple.   Skin:     General: Skin is warm and dry.      Capillary Refill: Capillary refill takes less than 2 seconds.      Coloration: Skin is pale.   Neurological:      General: No focal deficit present.      Mental Status: She is alert. Mental status is at baseline.   Psychiatric:         Mood and Affect: Affect is labile.         Assessment/Plan   Problem List Items Addressed This Visit             ICD-10-CM    Debility - Primary R53.81    HTN (hypertension) I10    Morbid obesity (CMS/HCC) E66.01    HLD (hyperlipidemia) E78.5    Chronic pain G89.29    Bipolar disorder in partial remission, most recent episode unspecified type (CMS/HCC) F31.70        Goals    None

## 2023-12-27 ENCOUNTER — NURSING HOME VISIT (OUTPATIENT)
Dept: POST ACUTE CARE | Facility: EXTERNAL LOCATION | Age: 68
End: 2023-12-27
Payer: MEDICARE

## 2023-12-27 DIAGNOSIS — G89.4 CHRONIC PAIN SYNDROME: ICD-10-CM

## 2023-12-27 DIAGNOSIS — E66.01 MORBID OBESITY (MULTI): ICD-10-CM

## 2023-12-27 DIAGNOSIS — I10 HYPERTENSION, UNSPECIFIED TYPE: ICD-10-CM

## 2023-12-27 DIAGNOSIS — R53.81 DEBILITY: Primary | ICD-10-CM

## 2023-12-27 DIAGNOSIS — F31.70 BIPOLAR DISORDER IN PARTIAL REMISSION, MOST RECENT EPISODE UNSPECIFIED TYPE (MULTI): ICD-10-CM

## 2023-12-27 DIAGNOSIS — E78.5 HYPERLIPIDEMIA, UNSPECIFIED HYPERLIPIDEMIA TYPE: ICD-10-CM

## 2023-12-27 PROCEDURE — 99308 SBSQ NF CARE LOW MDM 20: CPT | Performed by: FAMILY MEDICINE

## 2023-12-27 NOTE — LETTER
Patient: Fanny Charles  : 1955    Encounter Date: 2023    Subjective  Patient ID: Fanny Charles is a 68 y.o. female who is long term resident being seen and evaluated for multiple medical problems.    Bipolar depression, htn, morbid obesity, chronic pain, hld, debility         Review of Systems   Reason unable to perform ROS: debility.       Objective  There were no vitals taken for this visit.    Physical Exam  Vitals and nursing note reviewed.   Constitutional:       Appearance: She is obese. She is ill-appearing.   HENT:      Head: Normocephalic and atraumatic.      Nose: Nose normal.      Mouth/Throat:      Mouth: Mucous membranes are moist.      Pharynx: Oropharynx is clear.   Eyes:      Extraocular Movements: Extraocular movements intact.      Conjunctiva/sclera: Conjunctivae normal.      Pupils: Pupils are equal, round, and reactive to light.   Cardiovascular:      Rate and Rhythm: Normal rate and regular rhythm.      Pulses: Normal pulses.   Pulmonary:      Effort: Pulmonary effort is normal.      Breath sounds: Normal breath sounds.   Abdominal:      General: Bowel sounds are normal.      Palpations: Abdomen is soft.   Musculoskeletal:         General: Tenderness present. Normal range of motion.      Cervical back: Normal range of motion and neck supple.   Skin:     General: Skin is warm and dry.      Capillary Refill: Capillary refill takes less than 2 seconds.      Coloration: Skin is pale.   Neurological:      General: No focal deficit present.      Mental Status: She is alert. Mental status is at baseline.      Motor: Weakness present.      Gait: Gait abnormal.   Psychiatric:         Mood and Affect: Affect is flat.         Assessment/Plan  Problem List Items Addressed This Visit             ICD-10-CM    Debility - Primary R53.81    HTN (hypertension) I10    Morbid obesity (CMS/HCC) E66.01    HLD (hyperlipidemia) E78.5    Chronic pain G89.29    Bipolar disorder in partial remission, most  recent episode unspecified type (CMS/HCC) F31.70        Goals    None           Electronically Signed By: Jacqui Wesley MD   12/27/23  9:40 AM

## 2023-12-27 NOTE — PROGRESS NOTES
Subjective   Patient ID: Fanny Charles is a 68 y.o. female who is long term resident being seen and evaluated for multiple medical problems.    Bipolar depression, htn, morbid obesity, chronic pain, hld, debility         Review of Systems   Reason unable to perform ROS: debility.       Objective   There were no vitals taken for this visit.    Physical Exam  Vitals and nursing note reviewed.   Constitutional:       Appearance: She is obese. She is ill-appearing.   HENT:      Head: Normocephalic and atraumatic.      Nose: Nose normal.      Mouth/Throat:      Mouth: Mucous membranes are moist.      Pharynx: Oropharynx is clear.   Eyes:      Extraocular Movements: Extraocular movements intact.      Conjunctiva/sclera: Conjunctivae normal.      Pupils: Pupils are equal, round, and reactive to light.   Cardiovascular:      Rate and Rhythm: Normal rate and regular rhythm.      Pulses: Normal pulses.   Pulmonary:      Effort: Pulmonary effort is normal.      Breath sounds: Normal breath sounds.   Abdominal:      General: Bowel sounds are normal.      Palpations: Abdomen is soft.   Musculoskeletal:         General: Tenderness present. Normal range of motion.      Cervical back: Normal range of motion and neck supple.   Skin:     General: Skin is warm and dry.      Capillary Refill: Capillary refill takes less than 2 seconds.      Coloration: Skin is pale.   Neurological:      General: No focal deficit present.      Mental Status: She is alert. Mental status is at baseline.      Motor: Weakness present.      Gait: Gait abnormal.   Psychiatric:         Mood and Affect: Affect is flat.         Assessment/Plan   Problem List Items Addressed This Visit             ICD-10-CM    Debility - Primary R53.81    HTN (hypertension) I10    Morbid obesity (CMS/HCC) E66.01    HLD (hyperlipidemia) E78.5    Chronic pain G89.29    Bipolar disorder in partial remission, most recent episode unspecified type (CMS/HCC) F31.70        Goals     None

## 2024-01-05 ENCOUNTER — NURSING HOME VISIT (OUTPATIENT)
Dept: POST ACUTE CARE | Facility: EXTERNAL LOCATION | Age: 69
End: 2024-01-05
Payer: MEDICARE

## 2024-01-05 DIAGNOSIS — F31.70 BIPOLAR DISORDER IN PARTIAL REMISSION, MOST RECENT EPISODE UNSPECIFIED TYPE (MULTI): ICD-10-CM

## 2024-01-05 DIAGNOSIS — R53.81 DEBILITY: Primary | ICD-10-CM

## 2024-01-05 DIAGNOSIS — E78.5 HYPERLIPIDEMIA, UNSPECIFIED HYPERLIPIDEMIA TYPE: ICD-10-CM

## 2024-01-05 DIAGNOSIS — I10 HYPERTENSION, UNSPECIFIED TYPE: ICD-10-CM

## 2024-01-05 DIAGNOSIS — G89.4 CHRONIC PAIN SYNDROME: ICD-10-CM

## 2024-01-05 DIAGNOSIS — E66.01 MORBID OBESITY (MULTI): ICD-10-CM

## 2024-01-05 PROCEDURE — 99308 SBSQ NF CARE LOW MDM 20: CPT | Performed by: FAMILY MEDICINE

## 2024-01-05 NOTE — LETTER
Patient: Fanny Charles  : 1955    Encounter Date: 2024    Subjective  Patient ID: Fanny Charles is a 68 y.o. female who is long term resident being seen and evaluated for multiple medical problems.    Debility, morbid obesity, bipolar depression, htn, hld, chronic pain         Review of Systems   Reason unable to perform ROS: debility.       Objective  There were no vitals taken for this visit.    Physical Exam  Vitals and nursing note reviewed.   Constitutional:       Appearance: She is obese. She is ill-appearing.   HENT:      Head: Normocephalic and atraumatic.      Nose: Nose normal.      Mouth/Throat:      Mouth: Mucous membranes are moist.      Pharynx: Oropharynx is clear.   Eyes:      Extraocular Movements: Extraocular movements intact.      Conjunctiva/sclera: Conjunctivae normal.      Pupils: Pupils are equal, round, and reactive to light.   Cardiovascular:      Rate and Rhythm: Normal rate and regular rhythm.      Pulses: Normal pulses.   Pulmonary:      Effort: Pulmonary effort is normal.      Breath sounds: Normal breath sounds.   Abdominal:      General: Bowel sounds are normal.      Palpations: Abdomen is soft.   Musculoskeletal:         General: Tenderness present. Normal range of motion.      Cervical back: Normal range of motion and neck supple.   Skin:     General: Skin is warm and dry.      Capillary Refill: Capillary refill takes less than 2 seconds.      Coloration: Skin is pale.   Neurological:      Mental Status: She is alert. Mental status is at baseline.      Motor: Weakness present.      Gait: Gait abnormal.   Psychiatric:      Comments: flat         Assessment/Plan  Problem List Items Addressed This Visit             ICD-10-CM    Debility - Primary R53.81    HTN (hypertension) I10    Morbid obesity (CMS/HCC) E66.01    HLD (hyperlipidemia) E78.5    Chronic pain G89.29    Bipolar disorder in partial remission, most recent episode unspecified type (CMS/HCC) F31.70        Goals     None           Electronically Signed By: Jacqui Wesley MD   1/5/24  9:08 AM

## 2024-01-05 NOTE — PROGRESS NOTES
Subjective   Patient ID: Fanny Charles is a 68 y.o. female who is long term resident being seen and evaluated for multiple medical problems.    Debility, morbid obesity, bipolar depression, htn, hld, chronic pain         Review of Systems   Reason unable to perform ROS: debility.       Objective   There were no vitals taken for this visit.    Physical Exam  Vitals and nursing note reviewed.   Constitutional:       Appearance: She is obese. She is ill-appearing.   HENT:      Head: Normocephalic and atraumatic.      Nose: Nose normal.      Mouth/Throat:      Mouth: Mucous membranes are moist.      Pharynx: Oropharynx is clear.   Eyes:      Extraocular Movements: Extraocular movements intact.      Conjunctiva/sclera: Conjunctivae normal.      Pupils: Pupils are equal, round, and reactive to light.   Cardiovascular:      Rate and Rhythm: Normal rate and regular rhythm.      Pulses: Normal pulses.   Pulmonary:      Effort: Pulmonary effort is normal.      Breath sounds: Normal breath sounds.   Abdominal:      General: Bowel sounds are normal.      Palpations: Abdomen is soft.   Musculoskeletal:         General: Tenderness present. Normal range of motion.      Cervical back: Normal range of motion and neck supple.   Skin:     General: Skin is warm and dry.      Capillary Refill: Capillary refill takes less than 2 seconds.      Coloration: Skin is pale.   Neurological:      Mental Status: She is alert. Mental status is at baseline.      Motor: Weakness present.      Gait: Gait abnormal.   Psychiatric:      Comments: flat         Assessment/Plan   Problem List Items Addressed This Visit             ICD-10-CM    Debility - Primary R53.81    HTN (hypertension) I10    Morbid obesity (CMS/HCC) E66.01    HLD (hyperlipidemia) E78.5    Chronic pain G89.29    Bipolar disorder in partial remission, most recent episode unspecified type (CMS/HCC) F31.70        Goals    None

## 2024-01-22 ENCOUNTER — NURSING HOME VISIT (OUTPATIENT)
Dept: POST ACUTE CARE | Facility: EXTERNAL LOCATION | Age: 69
End: 2024-01-22
Payer: MEDICARE

## 2024-01-22 DIAGNOSIS — R53.81 DEBILITY: Primary | ICD-10-CM

## 2024-01-22 DIAGNOSIS — G89.4 CHRONIC PAIN SYNDROME: ICD-10-CM

## 2024-01-22 DIAGNOSIS — I10 HYPERTENSION, UNSPECIFIED TYPE: ICD-10-CM

## 2024-01-22 DIAGNOSIS — E66.01 MORBID OBESITY (MULTI): ICD-10-CM

## 2024-01-22 DIAGNOSIS — E78.5 HYPERLIPIDEMIA, UNSPECIFIED HYPERLIPIDEMIA TYPE: ICD-10-CM

## 2024-01-22 PROCEDURE — 99308 SBSQ NF CARE LOW MDM 20: CPT | Performed by: FAMILY MEDICINE

## 2024-01-22 NOTE — LETTER
Patient: Fanny Charles  : 1955    Encounter Date: 2024    Subjective  Patient ID: Fanny Charles is a 68 y.o. female who is long term resident being seen and evaluated for multiple medical problems.    Debility, weakness, htn, hld, morbid obesity,          Review of Systems   Reason unable to perform ROS: debility.       Objective  There were no vitals taken for this visit.    Physical Exam  Vitals and nursing note reviewed.   Constitutional:       Appearance: She is obese. She is ill-appearing.   HENT:      Head: Normocephalic and atraumatic.      Nose: Nose normal.      Mouth/Throat:      Mouth: Mucous membranes are moist.      Pharynx: Oropharynx is clear.   Eyes:      Extraocular Movements: Extraocular movements intact.      Conjunctiva/sclera: Conjunctivae normal.      Pupils: Pupils are equal, round, and reactive to light.   Cardiovascular:      Rate and Rhythm: Normal rate and regular rhythm.      Pulses: Normal pulses.   Pulmonary:      Effort: Pulmonary effort is normal.      Breath sounds: Normal breath sounds.   Abdominal:      General: Bowel sounds are normal.      Palpations: Abdomen is soft.   Musculoskeletal:         General: Tenderness present. Normal range of motion.      Cervical back: Normal range of motion and neck supple.   Skin:     General: Skin is warm and dry.      Capillary Refill: Capillary refill takes less than 2 seconds.      Coloration: Skin is pale.   Neurological:      Mental Status: She is alert. Mental status is at baseline.      Motor: Weakness present.      Gait: Gait abnormal.   Psychiatric:         Mood and Affect: Affect is flat.         Assessment/Plan  Problem List Items Addressed This Visit             ICD-10-CM    Debility - Primary R53.81    HTN (hypertension) I10    Morbid obesity (CMS/HCC) E66.01    HLD (hyperlipidemia) E78.5    Chronic pain G89.29        Goals    None           Electronically Signed By: Jacqui Wesley MD   24 12:31 PM

## 2024-01-22 NOTE — PROGRESS NOTES
Subjective   Patient ID: Fanny Charles is a 68 y.o. female who is long term resident being seen and evaluated for multiple medical problems.    Debility, weakness, htn, hld, morbid obesity,          Review of Systems   Reason unable to perform ROS: debility.       Objective   There were no vitals taken for this visit.    Physical Exam  Vitals and nursing note reviewed.   Constitutional:       Appearance: She is obese. She is ill-appearing.   HENT:      Head: Normocephalic and atraumatic.      Nose: Nose normal.      Mouth/Throat:      Mouth: Mucous membranes are moist.      Pharynx: Oropharynx is clear.   Eyes:      Extraocular Movements: Extraocular movements intact.      Conjunctiva/sclera: Conjunctivae normal.      Pupils: Pupils are equal, round, and reactive to light.   Cardiovascular:      Rate and Rhythm: Normal rate and regular rhythm.      Pulses: Normal pulses.   Pulmonary:      Effort: Pulmonary effort is normal.      Breath sounds: Normal breath sounds.   Abdominal:      General: Bowel sounds are normal.      Palpations: Abdomen is soft.   Musculoskeletal:         General: Tenderness present. Normal range of motion.      Cervical back: Normal range of motion and neck supple.   Skin:     General: Skin is warm and dry.      Capillary Refill: Capillary refill takes less than 2 seconds.      Coloration: Skin is pale.   Neurological:      Mental Status: She is alert. Mental status is at baseline.      Motor: Weakness present.      Gait: Gait abnormal.   Psychiatric:         Mood and Affect: Affect is flat.         Assessment/Plan   Problem List Items Addressed This Visit             ICD-10-CM    Debility - Primary R53.81    HTN (hypertension) I10    Morbid obesity (CMS/HCC) E66.01    HLD (hyperlipidemia) E78.5    Chronic pain G89.29        Goals    None

## 2024-01-30 ENCOUNTER — NURSING HOME VISIT (OUTPATIENT)
Dept: POST ACUTE CARE | Facility: EXTERNAL LOCATION | Age: 69
End: 2024-01-30
Payer: MEDICARE

## 2024-01-30 DIAGNOSIS — E78.5 HYPERLIPIDEMIA, UNSPECIFIED HYPERLIPIDEMIA TYPE: ICD-10-CM

## 2024-01-30 DIAGNOSIS — F31.70 BIPOLAR DISORDER IN PARTIAL REMISSION, MOST RECENT EPISODE UNSPECIFIED TYPE (MULTI): ICD-10-CM

## 2024-01-30 DIAGNOSIS — E66.01 MORBID OBESITY (MULTI): ICD-10-CM

## 2024-01-30 DIAGNOSIS — R53.81 DEBILITY: Primary | ICD-10-CM

## 2024-01-30 DIAGNOSIS — G89.4 CHRONIC PAIN SYNDROME: ICD-10-CM

## 2024-01-30 DIAGNOSIS — I10 HYPERTENSION, UNSPECIFIED TYPE: ICD-10-CM

## 2024-01-30 PROCEDURE — 99308 SBSQ NF CARE LOW MDM 20: CPT | Performed by: FAMILY MEDICINE

## 2024-01-30 NOTE — LETTER
Patient: Fanny Charles  : 1955    Encounter Date: 2024    Subjective  Patient ID: Fanny Charles is a 68 y.o. female who is long term resident being seen and evaluated for multiple medical problems.    Debility, bipolar, obesity, weakness, chronic pain, hld, htn         Review of Systems   Reason unable to perform ROS: flat.       Objective  There were no vitals taken for this visit.    Physical Exam  Vitals and nursing note reviewed.   Constitutional:       Appearance: She is obese. She is ill-appearing.   HENT:      Head: Normocephalic and atraumatic.      Nose: Nose normal.      Mouth/Throat:      Mouth: Mucous membranes are moist.      Pharynx: Oropharynx is clear.   Eyes:      Extraocular Movements: Extraocular movements intact.      Conjunctiva/sclera: Conjunctivae normal.      Pupils: Pupils are equal, round, and reactive to light.   Cardiovascular:      Rate and Rhythm: Normal rate and regular rhythm.      Pulses: Normal pulses.   Pulmonary:      Effort: Pulmonary effort is normal.      Breath sounds: Normal breath sounds.   Abdominal:      General: Bowel sounds are normal.      Palpations: Abdomen is soft.   Musculoskeletal:         General: Tenderness present. Normal range of motion.      Cervical back: Normal range of motion and neck supple.   Skin:     General: Skin is warm and dry.      Capillary Refill: Capillary refill takes less than 2 seconds.      Coloration: Skin is pale.   Neurological:      Mental Status: Mental status is at baseline.      Motor: Weakness present.      Gait: Gait abnormal.   Psychiatric:         Mood and Affect: Affect is flat.         Assessment/Plan  Problem List Items Addressed This Visit             ICD-10-CM    Debility - Primary R53.81    HTN (hypertension) I10    Morbid obesity (CMS/HCC) E66.01    HLD (hyperlipidemia) E78.5    Chronic pain G89.29    Bipolar disorder in partial remission, most recent episode unspecified type (CMS/HCC) F31.70        Goals     None           Electronically Signed By: Jacqui Wesley MD   2/5/24 11:56 AM

## 2024-02-05 NOTE — PROGRESS NOTES
Subjective   Patient ID: Fanny Charles is a 68 y.o. female who is long term resident being seen and evaluated for multiple medical problems.    Debility, bipolar, obesity, weakness, chronic pain, hld, htn         Review of Systems   Reason unable to perform ROS: flat.       Objective   There were no vitals taken for this visit.    Physical Exam  Vitals and nursing note reviewed.   Constitutional:       Appearance: She is obese. She is ill-appearing.   HENT:      Head: Normocephalic and atraumatic.      Nose: Nose normal.      Mouth/Throat:      Mouth: Mucous membranes are moist.      Pharynx: Oropharynx is clear.   Eyes:      Extraocular Movements: Extraocular movements intact.      Conjunctiva/sclera: Conjunctivae normal.      Pupils: Pupils are equal, round, and reactive to light.   Cardiovascular:      Rate and Rhythm: Normal rate and regular rhythm.      Pulses: Normal pulses.   Pulmonary:      Effort: Pulmonary effort is normal.      Breath sounds: Normal breath sounds.   Abdominal:      General: Bowel sounds are normal.      Palpations: Abdomen is soft.   Musculoskeletal:         General: Tenderness present. Normal range of motion.      Cervical back: Normal range of motion and neck supple.   Skin:     General: Skin is warm and dry.      Capillary Refill: Capillary refill takes less than 2 seconds.      Coloration: Skin is pale.   Neurological:      Mental Status: Mental status is at baseline.      Motor: Weakness present.      Gait: Gait abnormal.   Psychiatric:         Mood and Affect: Affect is flat.         Assessment/Plan   Problem List Items Addressed This Visit             ICD-10-CM    Debility - Primary R53.81    HTN (hypertension) I10    Morbid obesity (CMS/HCC) E66.01    HLD (hyperlipidemia) E78.5    Chronic pain G89.29    Bipolar disorder in partial remission, most recent episode unspecified type (CMS/HCC) F31.70        Goals    None

## 2024-02-07 ENCOUNTER — NURSING HOME VISIT (OUTPATIENT)
Dept: POST ACUTE CARE | Facility: EXTERNAL LOCATION | Age: 69
End: 2024-02-07
Payer: MEDICARE

## 2024-02-07 DIAGNOSIS — G89.4 CHRONIC PAIN SYNDROME: ICD-10-CM

## 2024-02-07 DIAGNOSIS — E66.01 MORBID OBESITY (MULTI): ICD-10-CM

## 2024-02-07 DIAGNOSIS — R53.81 DEBILITY: Primary | ICD-10-CM

## 2024-02-07 DIAGNOSIS — I10 HYPERTENSION, UNSPECIFIED TYPE: ICD-10-CM

## 2024-02-07 DIAGNOSIS — E78.5 HYPERLIPIDEMIA, UNSPECIFIED HYPERLIPIDEMIA TYPE: ICD-10-CM

## 2024-02-07 DIAGNOSIS — F31.70 BIPOLAR DISORDER IN PARTIAL REMISSION, MOST RECENT EPISODE UNSPECIFIED TYPE (MULTI): ICD-10-CM

## 2024-02-07 PROCEDURE — 99308 SBSQ NF CARE LOW MDM 20: CPT | Performed by: FAMILY MEDICINE

## 2024-02-07 NOTE — LETTER
Patient: Fanny Charles  : 1955    Encounter Date: 2024    Subjective  Patient ID: Fanny Charles is a 68 y.o. female who is long term resident being seen and evaluated for multiple medical problems.    Debility. Bipolar depression, morbid obesity, hld, chronic pain         Review of Systems   Reason unable to perform ROS: debility.       Objective  There were no vitals taken for this visit.    Physical Exam  Vitals and nursing note reviewed.   Constitutional:       Appearance: She is obese. She is ill-appearing.   HENT:      Head: Normocephalic and atraumatic.      Nose: Nose normal.      Mouth/Throat:      Mouth: Mucous membranes are moist.      Pharynx: Oropharynx is clear.   Eyes:      Extraocular Movements: Extraocular movements intact.      Conjunctiva/sclera: Conjunctivae normal.      Pupils: Pupils are equal, round, and reactive to light.   Cardiovascular:      Rate and Rhythm: Normal rate and regular rhythm.      Pulses: Normal pulses.   Pulmonary:      Effort: Pulmonary effort is normal.      Breath sounds: Normal breath sounds.   Abdominal:      General: Bowel sounds are normal.      Palpations: Abdomen is soft.   Musculoskeletal:         General: Tenderness present. Normal range of motion.      Cervical back: Normal range of motion and neck supple.   Skin:     General: Skin is warm and dry.      Capillary Refill: Capillary refill takes less than 2 seconds.      Coloration: Skin is pale.   Neurological:      General: No focal deficit present.      Mental Status: She is alert. Mental status is at baseline.      Motor: Weakness present.      Gait: Gait abnormal.   Psychiatric:         Attention and Perception: She is inattentive.         Mood and Affect: Affect is flat.         Assessment/Plan  Problem List Items Addressed This Visit             ICD-10-CM    Debility - Primary R53.81    HTN (hypertension) I10    Morbid obesity (CMS/HCC) E66.01    HLD (hyperlipidemia) E78.5    Chronic pain G89.29     Bipolar disorder in partial remission, most recent episode unspecified type (CMS/[facility]) F31.70        Goals    None           Electronically Signed By: Jacqui Wesley MD   2/12/24 10:20 AM

## 2024-02-12 NOTE — PROGRESS NOTES
Subjective   Patient ID: Fanny Charles is a 68 y.o. female who is long term resident being seen and evaluated for multiple medical problems.    Debility. Bipolar depression, morbid obesity, hld, chronic pain         Review of Systems   Reason unable to perform ROS: debility.       Objective   There were no vitals taken for this visit.    Physical Exam  Vitals and nursing note reviewed.   Constitutional:       Appearance: She is obese. She is ill-appearing.   HENT:      Head: Normocephalic and atraumatic.      Nose: Nose normal.      Mouth/Throat:      Mouth: Mucous membranes are moist.      Pharynx: Oropharynx is clear.   Eyes:      Extraocular Movements: Extraocular movements intact.      Conjunctiva/sclera: Conjunctivae normal.      Pupils: Pupils are equal, round, and reactive to light.   Cardiovascular:      Rate and Rhythm: Normal rate and regular rhythm.      Pulses: Normal pulses.   Pulmonary:      Effort: Pulmonary effort is normal.      Breath sounds: Normal breath sounds.   Abdominal:      General: Bowel sounds are normal.      Palpations: Abdomen is soft.   Musculoskeletal:         General: Tenderness present. Normal range of motion.      Cervical back: Normal range of motion and neck supple.   Skin:     General: Skin is warm and dry.      Capillary Refill: Capillary refill takes less than 2 seconds.      Coloration: Skin is pale.   Neurological:      General: No focal deficit present.      Mental Status: She is alert. Mental status is at baseline.      Motor: Weakness present.      Gait: Gait abnormal.   Psychiatric:         Attention and Perception: She is inattentive.         Mood and Affect: Affect is flat.         Assessment/Plan   Problem List Items Addressed This Visit             ICD-10-CM    Debility - Primary R53.81    HTN (hypertension) I10    Morbid obesity (CMS/HCC) E66.01    HLD (hyperlipidemia) E78.5    Chronic pain G89.29    Bipolar disorder in partial remission, most recent episode  unspecified type (CMS/HCC) F31.70        Goals    None

## 2024-02-15 ENCOUNTER — NURSING HOME VISIT (OUTPATIENT)
Dept: POST ACUTE CARE | Facility: EXTERNAL LOCATION | Age: 69
End: 2024-02-15
Payer: MEDICARE

## 2024-02-15 DIAGNOSIS — I10 HYPERTENSION, UNSPECIFIED TYPE: ICD-10-CM

## 2024-02-15 DIAGNOSIS — G89.4 CHRONIC PAIN SYNDROME: ICD-10-CM

## 2024-02-15 DIAGNOSIS — R53.81 DEBILITY: Primary | ICD-10-CM

## 2024-02-15 DIAGNOSIS — F31.70 BIPOLAR DISORDER IN PARTIAL REMISSION, MOST RECENT EPISODE UNSPECIFIED TYPE (MULTI): ICD-10-CM

## 2024-02-15 DIAGNOSIS — E78.5 HYPERLIPIDEMIA, UNSPECIFIED HYPERLIPIDEMIA TYPE: ICD-10-CM

## 2024-02-15 DIAGNOSIS — E66.01 MORBID OBESITY (MULTI): ICD-10-CM

## 2024-02-15 PROCEDURE — 99308 SBSQ NF CARE LOW MDM 20: CPT | Performed by: FAMILY MEDICINE

## 2024-02-15 NOTE — LETTER
Patient: Fanny Charles  : 1955    Encounter Date: 02/15/2024    Subjective  Patient ID: Fanny Charles is a 68 y.o. female who is long term resident being seen and evaluated for multiple medical problems.    Bipolar disorder, htn, debility, morbid obesity, `         Review of Systems   Reason unable to perform ROS: debility.       Objective  There were no vitals taken for this visit.    Physical Exam  Vitals and nursing note reviewed.   Constitutional:       Appearance: She is obese. She is ill-appearing.   HENT:      Head: Normocephalic and atraumatic.      Nose: Nose normal.      Mouth/Throat:      Mouth: Mucous membranes are moist.      Pharynx: Oropharynx is clear.   Eyes:      Extraocular Movements: Extraocular movements intact.      Conjunctiva/sclera: Conjunctivae normal.      Pupils: Pupils are equal, round, and reactive to light.   Cardiovascular:      Rate and Rhythm: Normal rate and regular rhythm.      Pulses: Normal pulses.   Pulmonary:      Effort: Pulmonary effort is normal.      Breath sounds: Normal breath sounds.   Abdominal:      General: Bowel sounds are normal.      Palpations: Abdomen is soft.   Musculoskeletal:         General: Tenderness present. Normal range of motion.      Cervical back: Normal range of motion and neck supple.   Skin:     General: Skin is warm and dry.      Capillary Refill: Capillary refill takes less than 2 seconds.      Coloration: Skin is pale.   Neurological:      Mental Status: Mental status is at baseline.   Psychiatric:         Attention and Perception: She is inattentive.         Mood and Affect: Affect is flat.         Assessment/Plan  Problem List Items Addressed This Visit             ICD-10-CM    Debility - Primary R53.81    HTN (hypertension) I10    Morbid obesity (CMS/HCC) E66.01    HLD (hyperlipidemia) E78.5    Chronic pain G89.29    Bipolar disorder in partial remission, most recent episode unspecified type (CMS/HCC) F31.70        Goals    None            Electronically Signed By: Jacqui Wesley MD   2/15/24 10:34 AM

## 2024-02-15 NOTE — PROGRESS NOTES
Subjective   Patient ID: Fanny Charles is a 68 y.o. female who is long term resident being seen and evaluated for multiple medical problems.    Bipolar disorder, htn, debility, morbid obesity, `         Review of Systems   Reason unable to perform ROS: debility.       Objective   There were no vitals taken for this visit.    Physical Exam  Vitals and nursing note reviewed.   Constitutional:       Appearance: She is obese. She is ill-appearing.   HENT:      Head: Normocephalic and atraumatic.      Nose: Nose normal.      Mouth/Throat:      Mouth: Mucous membranes are moist.      Pharynx: Oropharynx is clear.   Eyes:      Extraocular Movements: Extraocular movements intact.      Conjunctiva/sclera: Conjunctivae normal.      Pupils: Pupils are equal, round, and reactive to light.   Cardiovascular:      Rate and Rhythm: Normal rate and regular rhythm.      Pulses: Normal pulses.   Pulmonary:      Effort: Pulmonary effort is normal.      Breath sounds: Normal breath sounds.   Abdominal:      General: Bowel sounds are normal.      Palpations: Abdomen is soft.   Musculoskeletal:         General: Tenderness present. Normal range of motion.      Cervical back: Normal range of motion and neck supple.   Skin:     General: Skin is warm and dry.      Capillary Refill: Capillary refill takes less than 2 seconds.      Coloration: Skin is pale.   Neurological:      Mental Status: Mental status is at baseline.   Psychiatric:         Attention and Perception: She is inattentive.         Mood and Affect: Affect is flat.         Assessment/Plan   Problem List Items Addressed This Visit             ICD-10-CM    Debility - Primary R53.81    HTN (hypertension) I10    Morbid obesity (CMS/HCC) E66.01    HLD (hyperlipidemia) E78.5    Chronic pain G89.29    Bipolar disorder in partial remission, most recent episode unspecified type (CMS/HCC) F31.70        Goals    None

## 2024-02-23 ENCOUNTER — NURSING HOME VISIT (OUTPATIENT)
Dept: POST ACUTE CARE | Facility: EXTERNAL LOCATION | Age: 69
End: 2024-02-23
Payer: MEDICARE

## 2024-02-23 DIAGNOSIS — R53.81 DEBILITY: Primary | ICD-10-CM

## 2024-02-23 DIAGNOSIS — E66.01 MORBID OBESITY (MULTI): ICD-10-CM

## 2024-02-23 DIAGNOSIS — G89.4 CHRONIC PAIN SYNDROME: ICD-10-CM

## 2024-02-23 DIAGNOSIS — E78.5 HYPERLIPIDEMIA, UNSPECIFIED HYPERLIPIDEMIA TYPE: ICD-10-CM

## 2024-02-23 DIAGNOSIS — I10 HYPERTENSION, UNSPECIFIED TYPE: ICD-10-CM

## 2024-02-23 PROCEDURE — 99348 HOME/RES VST EST LOW MDM 30: CPT | Performed by: FAMILY MEDICINE

## 2024-02-23 NOTE — LETTER
Patient: Fanny Charles  : 1955    Encounter Date: 2024    Subjective  Patient ID: Fanny Charles is a 68 y.o. female who is long term resident being seen and evaluated for multiple medical problems.    Debility, htn, morbid obesity, hld, chronic pain         Review of Systems   Reason unable to perform ROS: debility.       Objective  There were no vitals taken for this visit.    Physical Exam  Vitals and nursing note reviewed.   Constitutional:       Appearance: She is ill-appearing.   HENT:      Head: Normocephalic and atraumatic.      Nose: Nose normal.      Mouth/Throat:      Mouth: Mucous membranes are moist.      Pharynx: Oropharynx is clear.   Eyes:      Extraocular Movements: Extraocular movements intact.      Conjunctiva/sclera: Conjunctivae normal.      Pupils: Pupils are equal, round, and reactive to light.   Cardiovascular:      Rate and Rhythm: Normal rate and regular rhythm.      Pulses: Normal pulses.   Pulmonary:      Effort: Pulmonary effort is normal.      Breath sounds: Normal breath sounds.   Abdominal:      General: Bowel sounds are normal.      Palpations: Abdomen is soft.   Musculoskeletal:         General: Tenderness present. Normal range of motion.      Cervical back: Normal range of motion and neck supple.   Skin:     General: Skin is warm and dry.      Capillary Refill: Capillary refill takes less than 2 seconds.      Coloration: Skin is pale.   Neurological:      Mental Status: Mental status is at baseline.   Psychiatric:         Attention and Perception: She is inattentive.         Mood and Affect: Affect is flat.         Judgment: Judgment is impulsive.         Assessment/Plan  Problem List Items Addressed This Visit             ICD-10-CM    Debility - Primary R53.81    HTN (hypertension) I10    Morbid obesity (CMS/HCC) E66.01    HLD (hyperlipidemia) E78.5    Chronic pain G89.29        Goals    None           Electronically Signed By: Jacqui Wesley MD   3/4/24 10:51 AM

## 2024-03-04 ENCOUNTER — NURSING HOME VISIT (OUTPATIENT)
Dept: POST ACUTE CARE | Facility: EXTERNAL LOCATION | Age: 69
End: 2024-03-04
Payer: MEDICARE

## 2024-03-04 DIAGNOSIS — R53.81 DEBILITY: Primary | ICD-10-CM

## 2024-03-04 DIAGNOSIS — G89.4 CHRONIC PAIN SYNDROME: ICD-10-CM

## 2024-03-04 DIAGNOSIS — E78.5 HYPERLIPIDEMIA, UNSPECIFIED HYPERLIPIDEMIA TYPE: ICD-10-CM

## 2024-03-04 DIAGNOSIS — E66.01 MORBID OBESITY (MULTI): ICD-10-CM

## 2024-03-04 DIAGNOSIS — F31.70 BIPOLAR DISORDER IN PARTIAL REMISSION, MOST RECENT EPISODE UNSPECIFIED TYPE (MULTI): ICD-10-CM

## 2024-03-04 DIAGNOSIS — I10 HYPERTENSION, UNSPECIFIED TYPE: ICD-10-CM

## 2024-03-04 PROCEDURE — 99348 HOME/RES VST EST LOW MDM 30: CPT | Performed by: FAMILY MEDICINE

## 2024-03-04 NOTE — PROGRESS NOTES
Subjective   Patient ID: Fanny Charles is a 68 y.o. female who is long term resident being seen and evaluated for multiple medical problems.    Bipolar, weakness, debility, htn, hld, chronic pain.          Review of Systems   Reason unable to perform ROS: debility.       Objective   There were no vitals taken for this visit.    Physical Exam  Vitals and nursing note reviewed.   Constitutional:       Appearance: She is ill-appearing.   HENT:      Head: Normocephalic and atraumatic.      Nose: Nose normal.      Mouth/Throat:      Mouth: Mucous membranes are moist.      Pharynx: Oropharynx is clear.   Eyes:      Extraocular Movements: Extraocular movements intact.      Conjunctiva/sclera: Conjunctivae normal.      Pupils: Pupils are equal, round, and reactive to light.   Cardiovascular:      Rate and Rhythm: Normal rate and regular rhythm.      Pulses: Normal pulses.   Pulmonary:      Effort: Pulmonary effort is normal.      Breath sounds: Normal breath sounds.   Abdominal:      General: Bowel sounds are normal.      Palpations: Abdomen is soft.   Musculoskeletal:         General: Tenderness present. Normal range of motion.      Cervical back: Normal range of motion and neck supple.   Skin:     General: Skin is warm and dry.      Capillary Refill: Capillary refill takes less than 2 seconds.      Coloration: Skin is pale.   Neurological:      Mental Status: She is alert. Mental status is at baseline.   Psychiatric:         Attention and Perception: She is inattentive.         Mood and Affect: Affect is flat.         Behavior: Behavior is slowed.         Cognition and Memory: Memory is impaired.         Assessment/Plan   Problem List Items Addressed This Visit             ICD-10-CM    Debility - Primary R53.81    HTN (hypertension) I10    Morbid obesity (CMS/HCC) E66.01    HLD (hyperlipidemia) E78.5    Chronic pain G89.29    Bipolar disorder in partial remission, most recent episode unspecified type (CMS/[facility]) F31.70         Goals    None

## 2024-03-04 NOTE — PROGRESS NOTES
Subjective   Patient ID: Fanny Charles is a 68 y.o. female who is long term resident being seen and evaluated for multiple medical problems.    Debility, htn, morbid obesity, hld, chronic pain         Review of Systems   Reason unable to perform ROS: debility.       Objective   There were no vitals taken for this visit.    Physical Exam  Vitals and nursing note reviewed.   Constitutional:       Appearance: She is ill-appearing.   HENT:      Head: Normocephalic and atraumatic.      Nose: Nose normal.      Mouth/Throat:      Mouth: Mucous membranes are moist.      Pharynx: Oropharynx is clear.   Eyes:      Extraocular Movements: Extraocular movements intact.      Conjunctiva/sclera: Conjunctivae normal.      Pupils: Pupils are equal, round, and reactive to light.   Cardiovascular:      Rate and Rhythm: Normal rate and regular rhythm.      Pulses: Normal pulses.   Pulmonary:      Effort: Pulmonary effort is normal.      Breath sounds: Normal breath sounds.   Abdominal:      General: Bowel sounds are normal.      Palpations: Abdomen is soft.   Musculoskeletal:         General: Tenderness present. Normal range of motion.      Cervical back: Normal range of motion and neck supple.   Skin:     General: Skin is warm and dry.      Capillary Refill: Capillary refill takes less than 2 seconds.      Coloration: Skin is pale.   Neurological:      Mental Status: Mental status is at baseline.   Psychiatric:         Attention and Perception: She is inattentive.         Mood and Affect: Affect is flat.         Judgment: Judgment is impulsive.         Assessment/Plan   Problem List Items Addressed This Visit             ICD-10-CM    Debility - Primary R53.81    HTN (hypertension) I10    Morbid obesity (CMS/HCC) E66.01    HLD (hyperlipidemia) E78.5    Chronic pain G89.29        Goals    None

## 2024-03-04 NOTE — LETTER
Patient: Fanny Charles  : 1955    Encounter Date: 2024    Subjective  Patient ID: Fanny Charles is a 68 y.o. female who is long term resident being seen and evaluated for multiple medical problems.    Bipolar, weakness, debility, htn, hld, chronic pain.          Review of Systems   Reason unable to perform ROS: debility.       Objective  There were no vitals taken for this visit.    Physical Exam  Vitals and nursing note reviewed.   Constitutional:       Appearance: She is ill-appearing.   HENT:      Head: Normocephalic and atraumatic.      Nose: Nose normal.      Mouth/Throat:      Mouth: Mucous membranes are moist.      Pharynx: Oropharynx is clear.   Eyes:      Extraocular Movements: Extraocular movements intact.      Conjunctiva/sclera: Conjunctivae normal.      Pupils: Pupils are equal, round, and reactive to light.   Cardiovascular:      Rate and Rhythm: Normal rate and regular rhythm.      Pulses: Normal pulses.   Pulmonary:      Effort: Pulmonary effort is normal.      Breath sounds: Normal breath sounds.   Abdominal:      General: Bowel sounds are normal.      Palpations: Abdomen is soft.   Musculoskeletal:         General: Tenderness present. Normal range of motion.      Cervical back: Normal range of motion and neck supple.   Skin:     General: Skin is warm and dry.      Capillary Refill: Capillary refill takes less than 2 seconds.      Coloration: Skin is pale.   Neurological:      Mental Status: She is alert. Mental status is at baseline.   Psychiatric:         Attention and Perception: She is inattentive.         Mood and Affect: Affect is flat.         Behavior: Behavior is slowed.         Cognition and Memory: Memory is impaired.         Assessment/Plan  Problem List Items Addressed This Visit             ICD-10-CM    Debility - Primary R53.81    HTN (hypertension) I10    Morbid obesity (CMS/HCC) E66.01    HLD (hyperlipidemia) E78.5    Chronic pain G89.29    Bipolar disorder in partial  remission, most recent episode unspecified type (CMS/[facility]) F31.70        Goals    None           Electronically Signed By: Jacqui Wesley MD   3/4/24 10:58 AM

## 2024-03-15 ENCOUNTER — NURSING HOME VISIT (OUTPATIENT)
Dept: POST ACUTE CARE | Facility: EXTERNAL LOCATION | Age: 69
End: 2024-03-15
Payer: MEDICARE

## 2024-03-15 DIAGNOSIS — I10 HYPERTENSION, UNSPECIFIED TYPE: ICD-10-CM

## 2024-03-15 DIAGNOSIS — G89.4 CHRONIC PAIN SYNDROME: ICD-10-CM

## 2024-03-15 DIAGNOSIS — E66.01 MORBID OBESITY (MULTI): ICD-10-CM

## 2024-03-15 DIAGNOSIS — R53.81 DEBILITY: Primary | ICD-10-CM

## 2024-03-15 DIAGNOSIS — F31.70 BIPOLAR DISORDER IN PARTIAL REMISSION, MOST RECENT EPISODE UNSPECIFIED TYPE (MULTI): ICD-10-CM

## 2024-03-15 DIAGNOSIS — E78.5 HYPERLIPIDEMIA, UNSPECIFIED HYPERLIPIDEMIA TYPE: ICD-10-CM

## 2024-03-15 PROCEDURE — 99308 SBSQ NF CARE LOW MDM 20: CPT | Performed by: FAMILY MEDICINE

## 2024-03-15 NOTE — PROGRESS NOTES
Subjective   Patient ID: Fanny Charles is a 68 y.o. female who is long term resident being seen and evaluated for multiple medical problems.    Debility, obesity, bipolar depression, htn, hld,          Review of Systems   Reason unable to perform ROS: debility.       Objective   There were no vitals taken for this visit.    Physical Exam  Vitals and nursing note reviewed.   Constitutional:       Appearance: She is obese. She is ill-appearing.   HENT:      Head: Normocephalic and atraumatic.      Nose: Nose normal.      Mouth/Throat:      Mouth: Mucous membranes are moist.      Pharynx: Oropharynx is clear.   Eyes:      Extraocular Movements: Extraocular movements intact.      Conjunctiva/sclera: Conjunctivae normal.      Pupils: Pupils are equal, round, and reactive to light.   Cardiovascular:      Rate and Rhythm: Normal rate and regular rhythm.      Pulses: Normal pulses.   Pulmonary:      Effort: Pulmonary effort is normal.      Breath sounds: Normal breath sounds.   Abdominal:      General: Bowel sounds are normal.      Palpations: Abdomen is soft.   Musculoskeletal:         General: Tenderness present. Normal range of motion.      Cervical back: Normal range of motion and neck supple.   Skin:     General: Skin is warm and dry.      Capillary Refill: Capillary refill takes less than 2 seconds.      Coloration: Skin is pale.   Neurological:      Mental Status: She is alert. Mental status is at baseline.      Motor: No weakness.      Gait: Gait normal.   Psychiatric:         Attention and Perception: She is inattentive.         Mood and Affect: Mood is depressed. Affect is flat.         Behavior: Behavior is slowed.         Assessment/Plan   Problem List Items Addressed This Visit    None       Goals    None

## 2024-03-15 NOTE — LETTER
Patient: Fanny Charles  : 1955    Encounter Date: 03/15/2024    Subjective  Patient ID: Fanny Charles is a 68 y.o. female who is long term resident being seen and evaluated for multiple medical problems.    Debility, obesity, bipolar depression, htn, hld,          Review of Systems   Reason unable to perform ROS: debility.       Objective  There were no vitals taken for this visit.    Physical Exam  Vitals and nursing note reviewed.   Constitutional:       Appearance: She is obese. She is ill-appearing.   HENT:      Head: Normocephalic and atraumatic.      Nose: Nose normal.      Mouth/Throat:      Mouth: Mucous membranes are moist.      Pharynx: Oropharynx is clear.   Eyes:      Extraocular Movements: Extraocular movements intact.      Conjunctiva/sclera: Conjunctivae normal.      Pupils: Pupils are equal, round, and reactive to light.   Cardiovascular:      Rate and Rhythm: Normal rate and regular rhythm.      Pulses: Normal pulses.   Pulmonary:      Effort: Pulmonary effort is normal.      Breath sounds: Normal breath sounds.   Abdominal:      General: Bowel sounds are normal.      Palpations: Abdomen is soft.   Musculoskeletal:         General: Tenderness present. Normal range of motion.      Cervical back: Normal range of motion and neck supple.   Skin:     General: Skin is warm and dry.      Capillary Refill: Capillary refill takes less than 2 seconds.      Coloration: Skin is pale.   Neurological:      Mental Status: She is alert. Mental status is at baseline.      Motor: No weakness.      Gait: Gait normal.   Psychiatric:         Attention and Perception: She is inattentive.         Mood and Affect: Mood is depressed. Affect is flat.         Behavior: Behavior is slowed.         Assessment/Plan  Problem List Items Addressed This Visit    None       Goals    None           Electronically Signed By: Jacqui Wesley MD   3/15/24 11:03 AM

## 2024-03-25 ENCOUNTER — NURSING HOME VISIT (OUTPATIENT)
Dept: POST ACUTE CARE | Facility: EXTERNAL LOCATION | Age: 69
End: 2024-03-25
Payer: MEDICARE

## 2024-03-25 DIAGNOSIS — F31.70 BIPOLAR DISORDER IN PARTIAL REMISSION, MOST RECENT EPISODE UNSPECIFIED TYPE (MULTI): ICD-10-CM

## 2024-03-25 DIAGNOSIS — E78.5 HYPERLIPIDEMIA, UNSPECIFIED HYPERLIPIDEMIA TYPE: ICD-10-CM

## 2024-03-25 DIAGNOSIS — G89.4 CHRONIC PAIN SYNDROME: ICD-10-CM

## 2024-03-25 DIAGNOSIS — E66.01 MORBID OBESITY (MULTI): ICD-10-CM

## 2024-03-25 DIAGNOSIS — I10 HYPERTENSION, UNSPECIFIED TYPE: ICD-10-CM

## 2024-03-25 DIAGNOSIS — R53.81 DEBILITY: Primary | ICD-10-CM

## 2024-03-25 PROCEDURE — 99308 SBSQ NF CARE LOW MDM 20: CPT | Performed by: FAMILY MEDICINE

## 2024-03-25 NOTE — PROGRESS NOTES
Subjective   Patient ID: Fanny Charles is a 68 y.o. female who is long term resident being seen and evaluated for multiple medical problems.    Obesity, debility, htn, chronic pain, bipolar depression         Review of Systems   Reason unable to perform ROS: debility.       Objective   There were no vitals taken for this visit.    Physical Exam  Vitals and nursing note reviewed.   Constitutional:       Appearance: Normal appearance. She is obese. She is ill-appearing.   HENT:      Head: Normocephalic and atraumatic.      Nose: Nose normal.      Mouth/Throat:      Mouth: Mucous membranes are moist.      Pharynx: Oropharynx is clear.   Eyes:      Extraocular Movements: Extraocular movements intact.      Conjunctiva/sclera: Conjunctivae normal.      Pupils: Pupils are equal, round, and reactive to light.   Cardiovascular:      Rate and Rhythm: Normal rate and regular rhythm.      Pulses: Normal pulses.   Pulmonary:      Effort: Pulmonary effort is normal.      Breath sounds: Normal breath sounds.   Abdominal:      General: Bowel sounds are normal.      Palpations: Abdomen is soft.   Musculoskeletal:         General: Tenderness present. Normal range of motion.      Cervical back: Normal range of motion and neck supple.   Skin:     General: Skin is warm and dry.      Capillary Refill: Capillary refill takes less than 2 seconds.      Coloration: Skin is pale.   Neurological:      Mental Status: She is alert. Mental status is at baseline.      Motor: No weakness.      Gait: Gait abnormal.   Psychiatric:         Attention and Perception: She is inattentive.         Mood and Affect: Mood is depressed. Affect is flat.         Behavior: Behavior is slowed.         Cognition and Memory: Cognition is impaired.         Assessment/Plan   Problem List Items Addressed This Visit             ICD-10-CM    Debility - Primary R53.81    HTN (hypertension) I10    Morbid obesity (CMS/HCC) E66.01    HLD (hyperlipidemia) E78.5    Chronic  pain G89.29    Bipolar disorder in partial remission, most recent episode unspecified type (CMS/[facility]) F31.70        Goals    None

## 2024-03-25 NOTE — LETTER
INTERVAL HPI/OVERNIGHT EVENTS:        REVIEW OF SYSTEMS:  CONSTITUTIONAL:   feels  netter      NECK: No pain or stiffnes  RESPIRATORY: No SOB   CARDIOVASCULAR: No chest pain, palpitations, dizziness,   GASTROINTESTINAL: No abdominal pain. No nausea, vomiting,   NEUROLOGICAL: No headaches, no  blurry  vision no  dizziness  SKIN: No itching,   MUSCULOSKELETAL:   diffuse  pains    MEDICATION:  acetaminophen   Tablet .. 650 milliGRAM(s) Oral every 6 hours PRN  acetaminophen  Suppository .. 650 milliGRAM(s) Rectal every 8 hours PRN  atorvastatin 40 milliGRAM(s) Oral at bedtime  chlorhexidine 4% Liquid 1 Application(s) Topical daily  dextrose 40% Gel 15 Gram(s) Oral once  dextrose 5%. 1000 milliLiter(s) IV Continuous <Continuous>  dextrose 5%. 1000 milliLiter(s) IV Continuous <Continuous>  dextrose 50% Injectable 25 Gram(s) IV Push once  dextrose 50% Injectable 12.5 Gram(s) IV Push once  dextrose 50% Injectable 25 Gram(s) IV Push once  enoxaparin Injectable 80 milliGRAM(s) SubCutaneous daily  folic acid 1 milliGRAM(s) Oral daily  glucagon  Injectable 1 milliGRAM(s) IntraMuscular once  insulin lispro (ADMELOG) corrective regimen sliding scale   SubCutaneous three times a day before meals  insulin lispro (ADMELOG) corrective regimen sliding scale   SubCutaneous at bedtime  isosorbide   mononitrate ER Tablet (IMDUR) 30 milliGRAM(s) Oral daily  metoprolol tartrate 25 milliGRAM(s) Oral two times a day  midodrine. 10 milliGRAM(s) Oral three times a day  polyethylene glycol 3350 17 Gram(s) Oral daily PRN  sodium chloride 0.65% Nasal 1 Spray(s) Both Nostrils four times a day PRN    Vital Signs Last 24 Hrs  T(C): 36.6 (17 Feb 2021 04:57), Max: 36.9 (16 Feb 2021 11:08)  T(F): 97.9 (17 Feb 2021 04:57), Max: 98.4 (16 Feb 2021 11:08)  HR: 82 (17 Feb 2021 04:57) (63 - 100)  BP: 95/63 (17 Feb 2021 04:57) (86/51 - 137/75)  BP(mean): --  RR: 17 (17 Feb 2021 04:57) (17 - 18)  SpO2: 96% (17 Feb 2021 04:57) (91% - 96%)    PHYSICAL EXAM:  GENERAL: NAD, well-groomed, well-developed  HEENT : Conjuntivae  clear sclerae anicteric  NECK: Supple, No JVD, Normal thyroid  NERVOUS SYSTEM:  Alert oriented x2  no  focal  deficits;   CHEST/LUNG: Clear    HEART: Regular rate and rhythm; No murmurs, rubs, or gallops  ABDOMEN: Soft, Nontender, Nondistended; Bowel sounds present  EXTREMITIES:   tenderness  shoulders  hips  back no  point  tenderness  SKIN: No rashes   LABS:                        10.2   10.10 )-----------( 79       ( 17 Feb 2021 07:16 )             32.8     02-17    144  |  110<H>  |  92<H>  ----------------------------<  173<H>  4.7   |  24  |  2.27<H>    Ca    8.0<L>      17 Feb 2021 07:16    TPro  5.8<L>  /  Alb  1.8<L>  /  TBili  0.7  /  DBili  x   /  AST  39<H>  /  ALT  30  /  AlkPhos  94  02-16        CAPILLARY BLOOD GLUCOSE      POCT Blood Glucose.: 213 mg/dL (17 Feb 2021 07:32)  POCT Blood Glucose.: 139 mg/dL (16 Feb 2021 21:33)  POCT Blood Glucose.: 146 mg/dL (16 Feb 2021 16:27)  POCT Blood Glucose.: 198 mg/dL (16 Feb 2021 13:04)      RADIOLOGY & ADDITIONAL TESTS:    Imaging reports  Personally Reviewed:  [ ] YES  [ ] NO    Consultant(s) Notes Reviewed:  [x ] YES  [ ] NO    Care Discussed with Consultants/Other Providers [x ] YES  [ ] NO  Problem/Plan - 1:  ·  Problem: Dehydration.   hx  CHF   WILL D/C LASIX  AS  RENAL  FUNCTION  WORSENING PT  APPEARS  EUVOLEMIC  CONTINUE  TO  MONITOR   follow renal function    Problem/Plan - 2:  ·  Problem: Altered mental status, unspecified altered mental status type.  Plan: IMPROVED      Problem/Plan - 3:  ·  Problem: Chronic diastolic congestive heart failure.  Plan: currently not in failure  monitor.  BNP  MOST  LIKELY  2/2  SEVERE  PULMONARY  HTN WITH  DILATED  RT  ATRIUM    Problem/Plan - 4:  ·  Problem: Type 2 diabetes mellitus with hyperglycemia, without long-term current use of insulin.  Plan: ss insulin coverage.     Problem/Plan - 5:  ·  Problem: Thrombocytopenia. Plan: pt on prior admission plts  range  will monitor closely   will change to  lovenox as  patient  inconstant  with  oral  intake    Problem/Plan - 6:  Problem: Mixed hyperlipidemia.Plan: cont statin.    Problem/Plan - 7:  ·  Problem: MICHELLE (acute kidney injury). Plan:   OBSERVE  WITH  DECREASED  LASIX  Problem/Plan - 8:  ·  Problem: Preventive measure.  Plan: on ac.   WILL  CONTINUE  ROECPHIN  EMPIRCALLY  AVR  MVR  OBSERVATION  SEVERE  PULMONARY  HTN  ON  ECHO  AT  [facility]  O2  will  discuss  with  pulmonary wendys pili Blackmon in  this  pt  ANEMIA  C/W  CHRONIC  DISEASE  PROCESS    Patient: Fanny Charles  : 1955    Encounter Date: 2024    Subjective  Patient ID: Fanny Charles is a 68 y.o. female who is long term resident being seen and evaluated for multiple medical problems.    Obesity, debility, htn, chronic pain, bipolar depression         Review of Systems   Reason unable to perform ROS: debility.       Objective  There were no vitals taken for this visit.    Physical Exam  Vitals and nursing note reviewed.   Constitutional:       Appearance: Normal appearance. She is obese. She is ill-appearing.   HENT:      Head: Normocephalic and atraumatic.      Nose: Nose normal.      Mouth/Throat:      Mouth: Mucous membranes are moist.      Pharynx: Oropharynx is clear.   Eyes:      Extraocular Movements: Extraocular movements intact.      Conjunctiva/sclera: Conjunctivae normal.      Pupils: Pupils are equal, round, and reactive to light.   Cardiovascular:      Rate and Rhythm: Normal rate and regular rhythm.      Pulses: Normal pulses.   Pulmonary:      Effort: Pulmonary effort is normal.      Breath sounds: Normal breath sounds.   Abdominal:      General: Bowel sounds are normal.      Palpations: Abdomen is soft.   Musculoskeletal:         General: Tenderness present. Normal range of motion.      Cervical back: Normal range of motion and neck supple.   Skin:     General: Skin is warm and dry.      Capillary Refill: Capillary refill takes less than 2 seconds.      Coloration: Skin is pale.   Neurological:      Mental Status: She is alert. Mental status is at baseline.      Motor: No weakness.      Gait: Gait abnormal.   Psychiatric:         Attention and Perception: She is inattentive.         Mood and Affect: Mood is depressed. Affect is flat.         Behavior: Behavior is slowed.         Cognition and Memory: Cognition is impaired.         Assessment/Plan  Problem List Items Addressed This Visit             ICD-10-CM    Debility - Primary R53.81    HTN (hypertension) I10    Morbid  obesity (CMS/[facility]) E66.01    HLD (hyperlipidemia) E78.5    Chronic pain G89.29    Bipolar disorder in partial remission, most recent episode unspecified type (CMS/[facility]) F31.70        Goals    None           Electronically Signed By: Jacqui Wesley MD   3/25/24  8:44 AM

## 2024-04-03 ENCOUNTER — NURSING HOME VISIT (OUTPATIENT)
Dept: POST ACUTE CARE | Facility: EXTERNAL LOCATION | Age: 69
End: 2024-04-03
Payer: MEDICARE

## 2024-04-03 DIAGNOSIS — G89.4 CHRONIC PAIN SYNDROME: ICD-10-CM

## 2024-04-03 DIAGNOSIS — F31.70 BIPOLAR DISORDER IN PARTIAL REMISSION, MOST RECENT EPISODE UNSPECIFIED TYPE (MULTI): ICD-10-CM

## 2024-04-03 DIAGNOSIS — E78.5 HYPERLIPIDEMIA, UNSPECIFIED HYPERLIPIDEMIA TYPE: ICD-10-CM

## 2024-04-03 DIAGNOSIS — R53.81 DEBILITY: Primary | ICD-10-CM

## 2024-04-03 DIAGNOSIS — E66.01 MORBID OBESITY (MULTI): ICD-10-CM

## 2024-04-03 DIAGNOSIS — I10 HYPERTENSION, UNSPECIFIED TYPE: ICD-10-CM

## 2024-04-03 PROCEDURE — 99308 SBSQ NF CARE LOW MDM 20: CPT | Performed by: FAMILY MEDICINE

## 2024-04-03 NOTE — LETTER
Patient: Fanny Charles  : 1955    Encounter Date: 2024    Subjective  Patient ID: Fanny Charles is a 69 y.o. female who is long term resident being seen and evaluated for multiple medical problems.    Deility, htn, bipolar depression, obesity         Review of Systems   Reason unable to perform ROS: debility.       Objective  There were no vitals taken for this visit.    Physical Exam  Vitals and nursing note reviewed.   Constitutional:       Appearance: She is obese. She is ill-appearing.   HENT:      Head: Normocephalic and atraumatic.      Nose: Nose normal.      Mouth/Throat:      Mouth: Mucous membranes are moist.      Pharynx: Oropharynx is clear.   Eyes:      Extraocular Movements: Extraocular movements intact.      Conjunctiva/sclera: Conjunctivae normal.      Pupils: Pupils are equal, round, and reactive to light.   Cardiovascular:      Rate and Rhythm: Normal rate and regular rhythm.      Pulses: Normal pulses.   Pulmonary:      Effort: Pulmonary effort is normal.      Breath sounds: Normal breath sounds.   Abdominal:      General: Bowel sounds are normal.      Palpations: Abdomen is soft.   Musculoskeletal:         General: Tenderness present. Normal range of motion.      Cervical back: Normal range of motion and neck supple.   Skin:     General: Skin is warm and dry.      Capillary Refill: Capillary refill takes less than 2 seconds.      Coloration: Skin is pale.   Neurological:      Mental Status: Mental status is at baseline.      Motor: Weakness present.      Gait: Gait abnormal.   Psychiatric:         Attention and Perception: She is inattentive.         Mood and Affect: Affect is flat.         Assessment/Plan       Goals    None           Electronically Signed By: Jacqui Wesley MD   24  4:21 PM

## 2024-04-11 NOTE — PROGRESS NOTES
Subjective   Patient ID: Fanny Charles is a 69 y.o. female who is long term resident being seen and evaluated for multiple medical problems.    Deility, htn, bipolar depression, obesity         Review of Systems   Reason unable to perform ROS: debility.       Objective   There were no vitals taken for this visit.    Physical Exam  Vitals and nursing note reviewed.   Constitutional:       Appearance: She is obese. She is ill-appearing.   HENT:      Head: Normocephalic and atraumatic.      Nose: Nose normal.      Mouth/Throat:      Mouth: Mucous membranes are moist.      Pharynx: Oropharynx is clear.   Eyes:      Extraocular Movements: Extraocular movements intact.      Conjunctiva/sclera: Conjunctivae normal.      Pupils: Pupils are equal, round, and reactive to light.   Cardiovascular:      Rate and Rhythm: Normal rate and regular rhythm.      Pulses: Normal pulses.   Pulmonary:      Effort: Pulmonary effort is normal.      Breath sounds: Normal breath sounds.   Abdominal:      General: Bowel sounds are normal.      Palpations: Abdomen is soft.   Musculoskeletal:         General: Tenderness present. Normal range of motion.      Cervical back: Normal range of motion and neck supple.   Skin:     General: Skin is warm and dry.      Capillary Refill: Capillary refill takes less than 2 seconds.      Coloration: Skin is pale.   Neurological:      Mental Status: Mental status is at baseline.      Motor: Weakness present.      Gait: Gait abnormal.   Psychiatric:         Attention and Perception: She is inattentive.         Mood and Affect: Affect is flat.         Assessment/Plan        Goals    None

## 2024-04-12 ENCOUNTER — NURSING HOME VISIT (OUTPATIENT)
Dept: POST ACUTE CARE | Facility: EXTERNAL LOCATION | Age: 69
End: 2024-04-12
Payer: MEDICARE

## 2024-04-12 DIAGNOSIS — R53.81 DEBILITY: ICD-10-CM

## 2024-04-12 DIAGNOSIS — E66.01 MORBID OBESITY (MULTI): ICD-10-CM

## 2024-04-12 DIAGNOSIS — F31.70 BIPOLAR DISORDER IN PARTIAL REMISSION, MOST RECENT EPISODE UNSPECIFIED TYPE (MULTI): ICD-10-CM

## 2024-04-12 DIAGNOSIS — E78.5 HYPERLIPIDEMIA, UNSPECIFIED HYPERLIPIDEMIA TYPE: ICD-10-CM

## 2024-04-12 DIAGNOSIS — I10 HYPERTENSION, UNSPECIFIED TYPE: Primary | ICD-10-CM

## 2024-04-12 DIAGNOSIS — G89.4 CHRONIC PAIN SYNDROME: ICD-10-CM

## 2024-04-12 PROCEDURE — 99308 SBSQ NF CARE LOW MDM 20: CPT | Performed by: FAMILY MEDICINE

## 2024-04-12 NOTE — PROGRESS NOTES
Subjective   Patient ID: Fanny Charles is a 69 y.o. female who is long term resident being seen and evaluated for multiple medical problems.    Bipolar, debility, morbid obesity, weakness, htn, hld, chronic pain.         Review of Systems   Reason unable to perform ROS: debility.       Objective   There were no vitals taken for this visit.    Physical Exam  Vitals and nursing note reviewed.   Constitutional:       Appearance: She is ill-appearing.   HENT:      Head: Normocephalic and atraumatic.      Nose: Nose normal.      Mouth/Throat:      Mouth: Mucous membranes are moist.      Pharynx: Oropharynx is clear.   Eyes:      Extraocular Movements: Extraocular movements intact.      Conjunctiva/sclera: Conjunctivae normal.      Pupils: Pupils are equal, round, and reactive to light.   Cardiovascular:      Rate and Rhythm: Normal rate and regular rhythm.      Pulses: Normal pulses.   Pulmonary:      Effort: Pulmonary effort is normal.      Breath sounds: Normal breath sounds.   Abdominal:      General: Bowel sounds are normal.      Palpations: Abdomen is soft.   Musculoskeletal:         General: Tenderness present. Normal range of motion.      Cervical back: Normal range of motion and neck supple.   Skin:     General: Skin is warm and dry.      Capillary Refill: Capillary refill takes less than 2 seconds.      Coloration: Skin is pale.   Neurological:      Mental Status: Mental status is at baseline.      Motor: Weakness present.      Gait: Gait abnormal.   Psychiatric:         Mood and Affect: Affect is flat.         Assessment/Plan   Problem List Items Addressed This Visit             ICD-10-CM    Debility R53.81    HTN (hypertension) - Primary I10    Morbid obesity (Multi) E66.01    HLD (hyperlipidemia) E78.5    Chronic pain G89.29    Bipolar disorder in partial remission, most recent episode unspecified type (Multi) F31.70        Goals    None

## 2024-04-12 NOTE — LETTER
Patient: Fanny Charles  : 1955    Encounter Date: 2024    Subjective  Patient ID: Fanny Charles is a 69 y.o. female who is long term resident being seen and evaluated for multiple medical problems.    Bipolar, debility, morbid obesity, weakness, htn, hld, chronic pain.         Review of Systems   Reason unable to perform ROS: debility.       Objective  There were no vitals taken for this visit.    Physical Exam  Vitals and nursing note reviewed.   Constitutional:       Appearance: She is ill-appearing.   HENT:      Head: Normocephalic and atraumatic.      Nose: Nose normal.      Mouth/Throat:      Mouth: Mucous membranes are moist.      Pharynx: Oropharynx is clear.   Eyes:      Extraocular Movements: Extraocular movements intact.      Conjunctiva/sclera: Conjunctivae normal.      Pupils: Pupils are equal, round, and reactive to light.   Cardiovascular:      Rate and Rhythm: Normal rate and regular rhythm.      Pulses: Normal pulses.   Pulmonary:      Effort: Pulmonary effort is normal.      Breath sounds: Normal breath sounds.   Abdominal:      General: Bowel sounds are normal.      Palpations: Abdomen is soft.   Musculoskeletal:         General: Tenderness present. Normal range of motion.      Cervical back: Normal range of motion and neck supple.   Skin:     General: Skin is warm and dry.      Capillary Refill: Capillary refill takes less than 2 seconds.      Coloration: Skin is pale.   Neurological:      Mental Status: Mental status is at baseline.      Motor: Weakness present.      Gait: Gait abnormal.   Psychiatric:         Mood and Affect: Affect is flat.         Assessment/Plan  Problem List Items Addressed This Visit             ICD-10-CM    Debility R53.81    HTN (hypertension) - Primary I10    Morbid obesity (Multi) E66.01    HLD (hyperlipidemia) E78.5    Chronic pain G89.29    Bipolar disorder in partial remission, most recent episode unspecified type (Multi) F31.70        Goals    None            Electronically Signed By: Jacqui Wesley MD   4/12/24 10:59 AM

## 2024-04-26 ENCOUNTER — NURSING HOME VISIT (OUTPATIENT)
Dept: POST ACUTE CARE | Facility: EXTERNAL LOCATION | Age: 69
End: 2024-04-26
Payer: MEDICARE

## 2024-04-26 DIAGNOSIS — F31.70 BIPOLAR DISORDER IN PARTIAL REMISSION, MOST RECENT EPISODE UNSPECIFIED TYPE (MULTI): ICD-10-CM

## 2024-04-26 DIAGNOSIS — E78.5 HYPERLIPIDEMIA, UNSPECIFIED HYPERLIPIDEMIA TYPE: ICD-10-CM

## 2024-04-26 DIAGNOSIS — E66.01 MORBID OBESITY (MULTI): ICD-10-CM

## 2024-04-26 DIAGNOSIS — G89.4 CHRONIC PAIN SYNDROME: ICD-10-CM

## 2024-04-26 DIAGNOSIS — I10 HYPERTENSION, UNSPECIFIED TYPE: ICD-10-CM

## 2024-04-26 DIAGNOSIS — R53.81 DEBILITY: Primary | ICD-10-CM

## 2024-04-26 PROCEDURE — 99308 SBSQ NF CARE LOW MDM 20: CPT | Performed by: FAMILY MEDICINE

## 2024-04-26 NOTE — LETTER
Patient: Fanny Charles  : 1955    Encounter Date: 2024    Subjective  Patient ID: Fanny Charles is a 69 y.o. female who is long term resident being seen and evaluated for multiple medical problems.    Morbid obesity, debility, htn, bipolar depression, hld, chronic pain         Review of Systems   Reason unable to perform ROS: debility.       Objective  There were no vitals taken for this visit.    Physical Exam  Vitals and nursing note reviewed.   Constitutional:       Appearance: She is obese. She is ill-appearing.   HENT:      Head: Normocephalic and atraumatic.      Nose: Nose normal.      Mouth/Throat:      Mouth: Mucous membranes are moist.      Pharynx: Oropharynx is clear.   Eyes:      Extraocular Movements: Extraocular movements intact.      Conjunctiva/sclera: Conjunctivae normal.      Pupils: Pupils are equal, round, and reactive to light.   Cardiovascular:      Rate and Rhythm: Normal rate and regular rhythm.      Pulses: Normal pulses.   Pulmonary:      Effort: Pulmonary effort is normal.      Breath sounds: Normal breath sounds.   Abdominal:      General: Bowel sounds are normal.      Palpations: Abdomen is soft.   Musculoskeletal:         General: Tenderness present. Normal range of motion.      Cervical back: Normal range of motion and neck supple.   Skin:     General: Skin is warm and dry.      Capillary Refill: Capillary refill takes less than 2 seconds.      Coloration: Skin is pale.   Neurological:      Mental Status: Mental status is at baseline.      Sensory: Sensory deficit present.      Motor: Weakness present.      Coordination: Coordination abnormal.      Gait: Gait abnormal.      Deep Tendon Reflexes: Reflexes abnormal.   Psychiatric:         Attention and Perception: She is inattentive.         Mood and Affect: Mood is depressed. Affect is flat.         Behavior: Behavior is slowed.         Cognition and Memory: Cognition is impaired.         Assessment/Plan  Problem List  Items Addressed This Visit             ICD-10-CM    Debility - Primary R53.81    HTN (hypertension) I10    Morbid obesity (Multi) E66.01    HLD (hyperlipidemia) E78.5    Chronic pain G89.29    Bipolar disorder in partial remission, most recent episode unspecified type (Multi) F31.70        Goals    None           Electronically Signed By: Jacqui Wesley MD   4/26/24 11:11 AM

## 2024-04-26 NOTE — PROGRESS NOTES
Subjective   Patient ID: Fanny Charles is a 69 y.o. female who is long term resident being seen and evaluated for multiple medical problems.    Morbid obesity, debility, htn, bipolar depression, hld, chronic pain         Review of Systems   Reason unable to perform ROS: debility.       Objective   There were no vitals taken for this visit.    Physical Exam  Vitals and nursing note reviewed.   Constitutional:       Appearance: She is obese. She is ill-appearing.   HENT:      Head: Normocephalic and atraumatic.      Nose: Nose normal.      Mouth/Throat:      Mouth: Mucous membranes are moist.      Pharynx: Oropharynx is clear.   Eyes:      Extraocular Movements: Extraocular movements intact.      Conjunctiva/sclera: Conjunctivae normal.      Pupils: Pupils are equal, round, and reactive to light.   Cardiovascular:      Rate and Rhythm: Normal rate and regular rhythm.      Pulses: Normal pulses.   Pulmonary:      Effort: Pulmonary effort is normal.      Breath sounds: Normal breath sounds.   Abdominal:      General: Bowel sounds are normal.      Palpations: Abdomen is soft.   Musculoskeletal:         General: Tenderness present. Normal range of motion.      Cervical back: Normal range of motion and neck supple.   Skin:     General: Skin is warm and dry.      Capillary Refill: Capillary refill takes less than 2 seconds.      Coloration: Skin is pale.   Neurological:      Mental Status: Mental status is at baseline.      Sensory: Sensory deficit present.      Motor: Weakness present.      Coordination: Coordination abnormal.      Gait: Gait abnormal.      Deep Tendon Reflexes: Reflexes abnormal.   Psychiatric:         Attention and Perception: She is inattentive.         Mood and Affect: Mood is depressed. Affect is flat.         Behavior: Behavior is slowed.         Cognition and Memory: Cognition is impaired.         Assessment/Plan   Problem List Items Addressed This Visit             ICD-10-CM    Debility - Primary  R53.81    HTN (hypertension) I10    Morbid obesity (Multi) E66.01    HLD (hyperlipidemia) E78.5    Chronic pain G89.29    Bipolar disorder in partial remission, most recent episode unspecified type (Multi) F31.70        Goals    None

## 2024-05-08 ENCOUNTER — NURSING HOME VISIT (OUTPATIENT)
Dept: POST ACUTE CARE | Facility: EXTERNAL LOCATION | Age: 69
End: 2024-05-08
Payer: MEDICARE

## 2024-05-08 DIAGNOSIS — R53.81 DEBILITY: Primary | ICD-10-CM

## 2024-05-08 DIAGNOSIS — F31.70 BIPOLAR DISORDER IN PARTIAL REMISSION, MOST RECENT EPISODE UNSPECIFIED TYPE (MULTI): ICD-10-CM

## 2024-05-08 DIAGNOSIS — E66.01 MORBID OBESITY (MULTI): ICD-10-CM

## 2024-05-08 DIAGNOSIS — I10 HYPERTENSION, UNSPECIFIED TYPE: ICD-10-CM

## 2024-05-08 DIAGNOSIS — G89.4 CHRONIC PAIN SYNDROME: ICD-10-CM

## 2024-05-08 DIAGNOSIS — E78.5 HYPERLIPIDEMIA, UNSPECIFIED HYPERLIPIDEMIA TYPE: ICD-10-CM

## 2024-05-08 PROCEDURE — 99308 SBSQ NF CARE LOW MDM 20: CPT | Performed by: FAMILY MEDICINE

## 2024-05-08 NOTE — LETTER
Patient: Fanny Charles  : 1955    Encounter Date: 2024    Subjective  Patient ID: Fanny Charles is a 69 y.o. female who is long term resident being seen and evaluated for multiple medical problems.    Dedbilioty. Bipolar depression, morbid obesity, hld, htn         Review of Systems   Reason unable to perform ROS: debility.       Objective  There were no vitals taken for this visit.    Physical Exam  Vitals and nursing note reviewed.   Constitutional:       Appearance: She is obese. She is ill-appearing.   HENT:      Head: Normocephalic and atraumatic.      Nose: Nose normal.      Mouth/Throat:      Mouth: Mucous membranes are moist.      Pharynx: Oropharynx is clear.   Eyes:      Extraocular Movements: Extraocular movements intact.      Conjunctiva/sclera: Conjunctivae normal.      Pupils: Pupils are equal, round, and reactive to light.   Cardiovascular:      Rate and Rhythm: Normal rate and regular rhythm.      Pulses: Normal pulses.   Pulmonary:      Effort: Pulmonary effort is normal.      Breath sounds: Normal breath sounds.   Abdominal:      General: Bowel sounds are normal.      Palpations: Abdomen is soft.   Musculoskeletal:         General: Normal range of motion.      Cervical back: Normal range of motion and neck supple.   Skin:     General: Skin is warm and dry.      Capillary Refill: Capillary refill takes less than 2 seconds.      Coloration: Skin is pale.   Neurological:      Mental Status: Mental status is at baseline.      Motor: Weakness present.      Gait: Gait abnormal.   Psychiatric:         Attention and Perception: She is inattentive.         Mood and Affect: Mood is depressed. Affect is flat.         Behavior: Behavior is slowed.         Assessment/Plan  Problem List Items Addressed This Visit             ICD-10-CM    Debility - Primary R53.81    HTN (hypertension) I10    Morbid obesity (Multi) E66.01    HLD (hyperlipidemia) E78.5    Chronic pain G89.29    Bipolar disorder in  partial remission, most recent episode unspecified type (Multi) F31.70        Goals    None           Electronically Signed By: Jacqui Wesley MD   5/13/24  7:56 AM

## 2024-05-13 NOTE — PROGRESS NOTES
Subjective   Patient ID: Fanny Charles is a 69 y.o. female who is long term resident being seen and evaluated for multiple medical problems.    Dedbilioty. Bipolar depression, morbid obesity, hld, htn         Review of Systems   Reason unable to perform ROS: debility.       Objective   There were no vitals taken for this visit.    Physical Exam  Vitals and nursing note reviewed.   Constitutional:       Appearance: She is obese. She is ill-appearing.   HENT:      Head: Normocephalic and atraumatic.      Nose: Nose normal.      Mouth/Throat:      Mouth: Mucous membranes are moist.      Pharynx: Oropharynx is clear.   Eyes:      Extraocular Movements: Extraocular movements intact.      Conjunctiva/sclera: Conjunctivae normal.      Pupils: Pupils are equal, round, and reactive to light.   Cardiovascular:      Rate and Rhythm: Normal rate and regular rhythm.      Pulses: Normal pulses.   Pulmonary:      Effort: Pulmonary effort is normal.      Breath sounds: Normal breath sounds.   Abdominal:      General: Bowel sounds are normal.      Palpations: Abdomen is soft.   Musculoskeletal:         General: Normal range of motion.      Cervical back: Normal range of motion and neck supple.   Skin:     General: Skin is warm and dry.      Capillary Refill: Capillary refill takes less than 2 seconds.      Coloration: Skin is pale.   Neurological:      Mental Status: Mental status is at baseline.      Motor: Weakness present.      Gait: Gait abnormal.   Psychiatric:         Attention and Perception: She is inattentive.         Mood and Affect: Mood is depressed. Affect is flat.         Behavior: Behavior is slowed.         Assessment/Plan   Problem List Items Addressed This Visit             ICD-10-CM    Debility - Primary R53.81    HTN (hypertension) I10    Morbid obesity (Multi) E66.01    HLD (hyperlipidemia) E78.5    Chronic pain G89.29    Bipolar disorder in partial remission, most recent episode unspecified type (Multi) F31.70         Goals    None

## 2024-05-17 ENCOUNTER — NURSING HOME VISIT (OUTPATIENT)
Dept: POST ACUTE CARE | Facility: EXTERNAL LOCATION | Age: 69
End: 2024-05-17
Payer: MEDICARE

## 2024-05-17 DIAGNOSIS — F31.70 BIPOLAR DISORDER IN PARTIAL REMISSION, MOST RECENT EPISODE UNSPECIFIED TYPE (MULTI): ICD-10-CM

## 2024-05-17 DIAGNOSIS — E66.01 MORBID OBESITY (MULTI): ICD-10-CM

## 2024-05-17 DIAGNOSIS — E78.5 HYPERLIPIDEMIA, UNSPECIFIED HYPERLIPIDEMIA TYPE: ICD-10-CM

## 2024-05-17 DIAGNOSIS — G89.4 CHRONIC PAIN SYNDROME: ICD-10-CM

## 2024-05-17 DIAGNOSIS — R53.81 DEBILITY: Primary | ICD-10-CM

## 2024-05-17 DIAGNOSIS — I10 HYPERTENSION, UNSPECIFIED TYPE: ICD-10-CM

## 2024-05-17 PROCEDURE — 99308 SBSQ NF CARE LOW MDM 20: CPT | Performed by: FAMILY MEDICINE

## 2024-05-17 NOTE — LETTER
Patient: Fanny Charles  : 1955    Encounter Date: 2024    Subjective  Patient ID: Fanny Charles is a 69 y.o. female who is long term resident being seen and evaluated for multiple medical problems.    Debility, morbid obesity, htn, bipolar disorder, hld, chronic pain         Review of Systems   Reason unable to perform ROS: debility.       Objective  There were no vitals taken for this visit.    Physical Exam  Vitals and nursing note reviewed.   Constitutional:       Appearance: She is ill-appearing.   HENT:      Head: Normocephalic and atraumatic.      Nose: Nose normal.      Mouth/Throat:      Mouth: Mucous membranes are moist.      Pharynx: Oropharynx is clear.   Eyes:      Extraocular Movements: Extraocular movements intact.      Conjunctiva/sclera: Conjunctivae normal.      Pupils: Pupils are equal, round, and reactive to light.   Cardiovascular:      Rate and Rhythm: Normal rate and regular rhythm.      Pulses: Normal pulses.   Pulmonary:      Effort: Pulmonary effort is normal.      Breath sounds: Normal breath sounds.   Abdominal:      General: Bowel sounds are normal.      Palpations: Abdomen is soft.   Musculoskeletal:         General: Tenderness present. Normal range of motion.      Cervical back: Normal range of motion and neck supple.   Skin:     General: Skin is warm and dry.      Capillary Refill: Capillary refill takes less than 2 seconds.      Coloration: Skin is pale.   Neurological:      Mental Status: She is alert. Mental status is at baseline.      Motor: Weakness present.      Gait: Gait abnormal.   Psychiatric:         Attention and Perception: She is inattentive.         Mood and Affect: Affect is flat.         Behavior: Behavior is slowed.         Assessment/Plan  Problem List Items Addressed This Visit             ICD-10-CM    Debility - Primary R53.81    HTN (hypertension) I10    Morbid obesity (Multi) E66.01    HLD (hyperlipidemia) E78.5    Chronic pain G89.29    Bipolar  disorder in partial remission, most recent episode unspecified type (Multi) F31.70        Goals    None           Electronically Signed By: Jacqui Wesley MD   5/20/24 10:04 AM

## 2024-05-20 NOTE — PROGRESS NOTES
Subjective   Patient ID: Fanny Charles is a 69 y.o. female who is long term resident being seen and evaluated for multiple medical problems.    Debility, morbid obesity, htn, bipolar disorder, hld, chronic pain         Review of Systems   Reason unable to perform ROS: debility.       Objective   There were no vitals taken for this visit.    Physical Exam  Vitals and nursing note reviewed.   Constitutional:       Appearance: She is ill-appearing.   HENT:      Head: Normocephalic and atraumatic.      Nose: Nose normal.      Mouth/Throat:      Mouth: Mucous membranes are moist.      Pharynx: Oropharynx is clear.   Eyes:      Extraocular Movements: Extraocular movements intact.      Conjunctiva/sclera: Conjunctivae normal.      Pupils: Pupils are equal, round, and reactive to light.   Cardiovascular:      Rate and Rhythm: Normal rate and regular rhythm.      Pulses: Normal pulses.   Pulmonary:      Effort: Pulmonary effort is normal.      Breath sounds: Normal breath sounds.   Abdominal:      General: Bowel sounds are normal.      Palpations: Abdomen is soft.   Musculoskeletal:         General: Tenderness present. Normal range of motion.      Cervical back: Normal range of motion and neck supple.   Skin:     General: Skin is warm and dry.      Capillary Refill: Capillary refill takes less than 2 seconds.      Coloration: Skin is pale.   Neurological:      Mental Status: She is alert. Mental status is at baseline.      Motor: Weakness present.      Gait: Gait abnormal.   Psychiatric:         Attention and Perception: She is inattentive.         Mood and Affect: Affect is flat.         Behavior: Behavior is slowed.         Assessment/Plan   Problem List Items Addressed This Visit             ICD-10-CM    Debility - Primary R53.81    HTN (hypertension) I10    Morbid obesity (Multi) E66.01    HLD (hyperlipidemia) E78.5    Chronic pain G89.29    Bipolar disorder in partial remission, most recent episode unspecified type  (Multi) F31.70        Goals    None

## 2024-05-29 ENCOUNTER — NURSING HOME VISIT (OUTPATIENT)
Dept: POST ACUTE CARE | Facility: EXTERNAL LOCATION | Age: 69
End: 2024-05-29
Payer: MEDICARE

## 2024-05-29 DIAGNOSIS — I10 HYPERTENSION, UNSPECIFIED TYPE: ICD-10-CM

## 2024-05-29 DIAGNOSIS — E66.01 MORBID OBESITY (MULTI): ICD-10-CM

## 2024-05-29 DIAGNOSIS — R53.81 DEBILITY: Primary | ICD-10-CM

## 2024-05-29 DIAGNOSIS — E78.5 HYPERLIPIDEMIA, UNSPECIFIED HYPERLIPIDEMIA TYPE: ICD-10-CM

## 2024-05-29 DIAGNOSIS — F31.70 BIPOLAR DISORDER IN PARTIAL REMISSION, MOST RECENT EPISODE UNSPECIFIED TYPE (MULTI): ICD-10-CM

## 2024-05-29 DIAGNOSIS — G89.4 CHRONIC PAIN SYNDROME: ICD-10-CM

## 2024-05-29 PROCEDURE — 99308 SBSQ NF CARE LOW MDM 20: CPT | Performed by: FAMILY MEDICINE

## 2024-05-29 NOTE — LETTER
Patient: Fanny Charles  : 1955    Encounter Date: 2024    Subjective  Patient ID: Fanny Charles is a 69 y.o. female who is long term resident being seen and evaluated for multiple medical problems.    Debility, bipolar, obesity, chronic pain, hld, htn         Review of Systems   Reason unable to perform ROS: debility.       Objective  There were no vitals taken for this visit.    Physical Exam  Vitals and nursing note reviewed.   Constitutional:       Appearance: She is obese.   HENT:      Head: Normocephalic and atraumatic.      Nose: Nose normal.      Mouth/Throat:      Mouth: Mucous membranes are moist.      Pharynx: Oropharynx is clear.   Eyes:      Extraocular Movements: Extraocular movements intact.      Conjunctiva/sclera: Conjunctivae normal.      Pupils: Pupils are equal, round, and reactive to light.   Cardiovascular:      Rate and Rhythm: Normal rate and regular rhythm.      Pulses: Normal pulses.   Pulmonary:      Effort: Pulmonary effort is normal.      Breath sounds: Normal breath sounds.   Abdominal:      General: Bowel sounds are normal.      Palpations: Abdomen is soft.   Musculoskeletal:         General: Tenderness present. Normal range of motion.      Cervical back: Normal range of motion and neck supple.   Skin:     General: Skin is warm and dry.      Capillary Refill: Capillary refill takes less than 2 seconds.      Coloration: Skin is pale.   Neurological:      Mental Status: Mental status is at baseline.      Motor: Weakness present.      Gait: Gait abnormal.   Psychiatric:         Attention and Perception: She is inattentive.         Mood and Affect: Mood is depressed. Affect is flat.         Behavior: Behavior is slowed.         Thought Content: Thought content normal.         Judgment: Judgment normal.         Assessment/Plan  Problem List Items Addressed This Visit             ICD-10-CM    Debility - Primary R53.81    HTN (hypertension) I10    Morbid obesity (Multi) E66.01     HLD (hyperlipidemia) E78.5    Chronic pain G89.29    Bipolar disorder in partial remission, most recent episode unspecified type (Multi) F31.70        Goals    None           Electronically Signed By: Jacqui Wesley MD   5/30/24 11:27 AM

## 2024-05-30 NOTE — PROGRESS NOTES
Subjective   Patient ID: Fanny Charles is a 69 y.o. female who is long term resident being seen and evaluated for multiple medical problems.    Debility, bipolar, obesity, chronic pain, hld, htn         Review of Systems   Reason unable to perform ROS: debility.       Objective   There were no vitals taken for this visit.    Physical Exam  Vitals and nursing note reviewed.   Constitutional:       Appearance: She is obese.   HENT:      Head: Normocephalic and atraumatic.      Nose: Nose normal.      Mouth/Throat:      Mouth: Mucous membranes are moist.      Pharynx: Oropharynx is clear.   Eyes:      Extraocular Movements: Extraocular movements intact.      Conjunctiva/sclera: Conjunctivae normal.      Pupils: Pupils are equal, round, and reactive to light.   Cardiovascular:      Rate and Rhythm: Normal rate and regular rhythm.      Pulses: Normal pulses.   Pulmonary:      Effort: Pulmonary effort is normal.      Breath sounds: Normal breath sounds.   Abdominal:      General: Bowel sounds are normal.      Palpations: Abdomen is soft.   Musculoskeletal:         General: Tenderness present. Normal range of motion.      Cervical back: Normal range of motion and neck supple.   Skin:     General: Skin is warm and dry.      Capillary Refill: Capillary refill takes less than 2 seconds.      Coloration: Skin is pale.   Neurological:      Mental Status: Mental status is at baseline.      Motor: Weakness present.      Gait: Gait abnormal.   Psychiatric:         Attention and Perception: She is inattentive.         Mood and Affect: Mood is depressed. Affect is flat.         Behavior: Behavior is slowed.         Thought Content: Thought content normal.         Judgment: Judgment normal.         Assessment/Plan   Problem List Items Addressed This Visit             ICD-10-CM    Debility - Primary R53.81    HTN (hypertension) I10    Morbid obesity (Multi) E66.01    HLD (hyperlipidemia) E78.5    Chronic pain G89.29    Bipolar  disorder in partial remission, most recent episode unspecified type (Multi) F31.70        Goals    None

## 2024-06-10 ENCOUNTER — NURSING HOME VISIT (OUTPATIENT)
Dept: POST ACUTE CARE | Facility: EXTERNAL LOCATION | Age: 69
End: 2024-06-10
Payer: MEDICARE

## 2024-06-10 DIAGNOSIS — I10 HYPERTENSION, UNSPECIFIED TYPE: ICD-10-CM

## 2024-06-10 DIAGNOSIS — E66.01 MORBID OBESITY (MULTI): ICD-10-CM

## 2024-06-10 DIAGNOSIS — E78.5 HYPERLIPIDEMIA, UNSPECIFIED HYPERLIPIDEMIA TYPE: ICD-10-CM

## 2024-06-10 DIAGNOSIS — R53.81 DEBILITY: Primary | ICD-10-CM

## 2024-06-10 DIAGNOSIS — F31.70 BIPOLAR DISORDER IN PARTIAL REMISSION, MOST RECENT EPISODE UNSPECIFIED TYPE (MULTI): ICD-10-CM

## 2024-06-10 DIAGNOSIS — G89.4 CHRONIC PAIN SYNDROME: ICD-10-CM

## 2024-06-10 PROCEDURE — 99308 SBSQ NF CARE LOW MDM 20: CPT | Performed by: FAMILY MEDICINE

## 2024-06-10 NOTE — LETTER
Patient: Fanny Charles  : 1955    Encounter Date: 06/10/2024    Subjective  Patient ID: Fanny Charles is a 69 y.o. female who is long term resident being seen and evaluated for multiple medical problems.    Bipolar, obesity, htn, debility, hld, chronic pain         Review of Systems   Reason unable to perform ROS: debility.       Objective  There were no vitals taken for this visit.    Physical Exam  Vitals and nursing note reviewed.   Constitutional:       Appearance: She is ill-appearing.   HENT:      Head: Normocephalic and atraumatic.      Nose: Nose normal.      Mouth/Throat:      Mouth: Mucous membranes are moist.      Pharynx: Oropharynx is clear.   Eyes:      Extraocular Movements: Extraocular movements intact.      Conjunctiva/sclera: Conjunctivae normal.      Pupils: Pupils are equal, round, and reactive to light.   Cardiovascular:      Rate and Rhythm: Normal rate and regular rhythm.      Pulses: Normal pulses.   Pulmonary:      Effort: Pulmonary effort is normal.      Breath sounds: Normal breath sounds.   Abdominal:      General: Bowel sounds are normal.      Palpations: Abdomen is soft.   Musculoskeletal:         General: Tenderness present. Normal range of motion.      Cervical back: Normal range of motion and neck supple.   Skin:     General: Skin is warm and dry.      Capillary Refill: Capillary refill takes less than 2 seconds.      Coloration: Skin is pale.   Neurological:      Mental Status: She is alert. Mental status is at baseline.      Sensory: Sensory deficit present.      Motor: Weakness present.      Coordination: Coordination abnormal.      Gait: Gait abnormal.      Deep Tendon Reflexes: Reflexes abnormal.   Psychiatric:         Cognition and Memory: Cognition is impaired.         Assessment/Plan  Problem List Items Addressed This Visit             ICD-10-CM    Debility - Primary R53.81    HTN (hypertension) I10    Morbid obesity (Multi) E66.01    HLD (hyperlipidemia) E78.5     Chronic pain G89.29    Bipolar disorder in partial remission, most recent episode unspecified type (Multi) F31.70        Goals    None           Electronically Signed By: Jacqui Wesley MD   6/17/24 10:35 AM

## 2024-06-17 NOTE — PROGRESS NOTES
Subjective   Patient ID: Fanny Charles is a 69 y.o. female who is long term resident being seen and evaluated for multiple medical problems.    Bipolar, obesity, htn, debility, hld, chronic pain         Review of Systems   Reason unable to perform ROS: debility.       Objective   There were no vitals taken for this visit.    Physical Exam  Vitals and nursing note reviewed.   Constitutional:       Appearance: She is ill-appearing.   HENT:      Head: Normocephalic and atraumatic.      Nose: Nose normal.      Mouth/Throat:      Mouth: Mucous membranes are moist.      Pharynx: Oropharynx is clear.   Eyes:      Extraocular Movements: Extraocular movements intact.      Conjunctiva/sclera: Conjunctivae normal.      Pupils: Pupils are equal, round, and reactive to light.   Cardiovascular:      Rate and Rhythm: Normal rate and regular rhythm.      Pulses: Normal pulses.   Pulmonary:      Effort: Pulmonary effort is normal.      Breath sounds: Normal breath sounds.   Abdominal:      General: Bowel sounds are normal.      Palpations: Abdomen is soft.   Musculoskeletal:         General: Tenderness present. Normal range of motion.      Cervical back: Normal range of motion and neck supple.   Skin:     General: Skin is warm and dry.      Capillary Refill: Capillary refill takes less than 2 seconds.      Coloration: Skin is pale.   Neurological:      Mental Status: She is alert. Mental status is at baseline.      Sensory: Sensory deficit present.      Motor: Weakness present.      Coordination: Coordination abnormal.      Gait: Gait abnormal.      Deep Tendon Reflexes: Reflexes abnormal.   Psychiatric:         Cognition and Memory: Cognition is impaired.         Assessment/Plan   Problem List Items Addressed This Visit             ICD-10-CM    Debility - Primary R53.81    HTN (hypertension) I10    Morbid obesity (Multi) E66.01    HLD (hyperlipidemia) E78.5    Chronic pain G89.29    Bipolar disorder in partial remission, most  recent episode unspecified type (Multi) F31.70        Goals    None

## 2024-06-27 ENCOUNTER — NURSING HOME VISIT (OUTPATIENT)
Dept: POST ACUTE CARE | Facility: EXTERNAL LOCATION | Age: 69
End: 2024-06-27
Payer: MEDICARE

## 2024-06-27 DIAGNOSIS — F31.70 BIPOLAR DISORDER IN PARTIAL REMISSION, MOST RECENT EPISODE UNSPECIFIED TYPE (MULTI): ICD-10-CM

## 2024-06-27 DIAGNOSIS — G89.4 CHRONIC PAIN SYNDROME: ICD-10-CM

## 2024-06-27 DIAGNOSIS — E78.5 HYPERLIPIDEMIA, UNSPECIFIED HYPERLIPIDEMIA TYPE: ICD-10-CM

## 2024-06-27 DIAGNOSIS — I10 HYPERTENSION, UNSPECIFIED TYPE: ICD-10-CM

## 2024-06-27 DIAGNOSIS — E66.01 MORBID OBESITY (MULTI): ICD-10-CM

## 2024-06-27 DIAGNOSIS — R53.81 DEBILITY: Primary | ICD-10-CM

## 2024-06-27 PROCEDURE — 99308 SBSQ NF CARE LOW MDM 20: CPT | Performed by: FAMILY MEDICINE

## 2024-06-27 NOTE — PROGRESS NOTES
Subjective   Patient ID: Fanny Charles is a 69 y.o. female who is long term resident being seen and evaluated for multiple medical problems.    Debility, bipolar depression, obesity, htn, hld         Review of Systems   Reason unable to perform ROS: debility.       Objective   There were no vitals taken for this visit.    Physical Exam  Vitals and nursing note reviewed.   Constitutional:       Appearance: She is obese. She is ill-appearing.   HENT:      Head: Normocephalic and atraumatic.      Nose: Nose normal.      Mouth/Throat:      Mouth: Mucous membranes are moist.      Pharynx: Oropharynx is clear.   Eyes:      Extraocular Movements: Extraocular movements intact.      Conjunctiva/sclera: Conjunctivae normal.      Pupils: Pupils are equal, round, and reactive to light.   Cardiovascular:      Rate and Rhythm: Normal rate and regular rhythm.      Pulses: Normal pulses.   Pulmonary:      Effort: Pulmonary effort is normal.      Breath sounds: Normal breath sounds.   Abdominal:      General: Bowel sounds are normal.      Palpations: Abdomen is soft.   Musculoskeletal:         General: Tenderness present. Normal range of motion.      Cervical back: Normal range of motion and neck supple.   Skin:     General: Skin is warm and dry.      Capillary Refill: Capillary refill takes less than 2 seconds.      Coloration: Skin is pale.   Neurological:      General: No focal deficit present.      Mental Status: She is alert.      Sensory: Sensory deficit present.      Motor: Weakness present.      Coordination: Coordination abnormal.      Gait: Gait abnormal.      Deep Tendon Reflexes: Reflexes abnormal.   Psychiatric:         Attention and Perception: She is inattentive.         Mood and Affect: Affect is flat.         Behavior: Behavior normal.         Thought Content: Thought content normal.         Judgment: Judgment normal.         Assessment/Plan   Problem List Items Addressed This Visit             ICD-10-CM     Debility - Primary R53.81    HTN (hypertension) I10    Morbid obesity (Multi) E66.01    HLD (hyperlipidemia) E78.5    Chronic pain G89.29    Bipolar disorder in partial remission, most recent episode unspecified type (Multi) F31.70        Goals    None

## 2024-06-27 NOTE — LETTER
Patient: Fanny Charles  : 1955    Encounter Date: 2024    Subjective  Patient ID: Fanny Charles is a 69 y.o. female who is long term resident being seen and evaluated for multiple medical problems.    Debility, bipolar depression, obesity, htn, hld         Review of Systems   Reason unable to perform ROS: debility.       Objective  There were no vitals taken for this visit.    Physical Exam  Vitals and nursing note reviewed.   Constitutional:       Appearance: She is obese. She is ill-appearing.   HENT:      Head: Normocephalic and atraumatic.      Nose: Nose normal.      Mouth/Throat:      Mouth: Mucous membranes are moist.      Pharynx: Oropharynx is clear.   Eyes:      Extraocular Movements: Extraocular movements intact.      Conjunctiva/sclera: Conjunctivae normal.      Pupils: Pupils are equal, round, and reactive to light.   Cardiovascular:      Rate and Rhythm: Normal rate and regular rhythm.      Pulses: Normal pulses.   Pulmonary:      Effort: Pulmonary effort is normal.      Breath sounds: Normal breath sounds.   Abdominal:      General: Bowel sounds are normal.      Palpations: Abdomen is soft.   Musculoskeletal:         General: Tenderness present. Normal range of motion.      Cervical back: Normal range of motion and neck supple.   Skin:     General: Skin is warm and dry.      Capillary Refill: Capillary refill takes less than 2 seconds.      Coloration: Skin is pale.   Neurological:      General: No focal deficit present.      Mental Status: She is alert.      Sensory: Sensory deficit present.      Motor: Weakness present.      Coordination: Coordination abnormal.      Gait: Gait abnormal.      Deep Tendon Reflexes: Reflexes abnormal.   Psychiatric:         Attention and Perception: She is inattentive.         Mood and Affect: Affect is flat.         Behavior: Behavior normal.         Thought Content: Thought content normal.         Judgment: Judgment normal.          Assessment/Plan  Problem List Items Addressed This Visit             ICD-10-CM    Debility - Primary R53.81    HTN (hypertension) I10    Morbid obesity (Multi) E66.01    HLD (hyperlipidemia) E78.5    Chronic pain G89.29    Bipolar disorder in partial remission, most recent episode unspecified type (Multi) F31.70        Goals    None           Electronically Signed By: Jacqui Wesley MD   6/27/24  1:17 PM

## 2024-07-15 ENCOUNTER — NURSING HOME VISIT (OUTPATIENT)
Dept: POST ACUTE CARE | Facility: EXTERNAL LOCATION | Age: 69
End: 2024-07-15
Payer: MEDICARE

## 2024-07-15 DIAGNOSIS — E78.5 HYPERLIPIDEMIA, UNSPECIFIED HYPERLIPIDEMIA TYPE: ICD-10-CM

## 2024-07-15 DIAGNOSIS — G89.4 CHRONIC PAIN SYNDROME: ICD-10-CM

## 2024-07-15 DIAGNOSIS — R53.81 DEBILITY: Primary | ICD-10-CM

## 2024-07-15 DIAGNOSIS — E66.01 MORBID OBESITY (MULTI): ICD-10-CM

## 2024-07-15 DIAGNOSIS — I10 HYPERTENSION, UNSPECIFIED TYPE: ICD-10-CM

## 2024-07-15 DIAGNOSIS — F31.70 BIPOLAR DISORDER IN PARTIAL REMISSION, MOST RECENT EPISODE UNSPECIFIED TYPE (MULTI): ICD-10-CM

## 2024-07-15 PROCEDURE — 99308 SBSQ NF CARE LOW MDM 20: CPT | Performed by: FAMILY MEDICINE

## 2024-07-15 NOTE — LETTER
Patient: Fanny Charles  : 1955    Encounter Date: 07/15/2024    Subjective  Patient ID: Fanny Charles is a 69 y.o. female who presents for No chief complaint on file..    Debility, bipolar depression, htn, hld         Review of Systems   Reason unable to perform ROS: debility.       Objective  There were no vitals taken for this visit.    Physical Exam  Vitals and nursing note reviewed.   Constitutional:       Appearance: She is ill-appearing.   HENT:      Head: Normocephalic and atraumatic.      Nose: Nose normal.      Mouth/Throat:      Mouth: Mucous membranes are moist.      Pharynx: Oropharynx is clear.   Eyes:      Extraocular Movements: Extraocular movements intact.      Conjunctiva/sclera: Conjunctivae normal.      Pupils: Pupils are equal, round, and reactive to light.   Cardiovascular:      Rate and Rhythm: Normal rate and regular rhythm.      Pulses: Normal pulses.   Pulmonary:      Effort: Pulmonary effort is normal.      Breath sounds: Normal breath sounds.   Abdominal:      General: Bowel sounds are normal.      Palpations: Abdomen is soft.   Musculoskeletal:         General: Tenderness present. Normal range of motion.      Cervical back: Normal range of motion and neck supple.   Skin:     General: Skin is warm and dry.      Capillary Refill: Capillary refill takes less than 2 seconds.      Coloration: Skin is pale.   Neurological:      General: No focal deficit present.      Mental Status: She is alert.      Motor: Weakness present.      Coordination: Coordination abnormal.      Gait: Gait abnormal.      Deep Tendon Reflexes: Reflexes abnormal.   Psychiatric:         Mood and Affect: Affect is flat.         Assessment/Plan  Problem List Items Addressed This Visit             ICD-10-CM    Debility - Primary R53.81    HTN (hypertension) I10    Morbid obesity (Multi) E66.01    HLD (hyperlipidemia) E78.5    Chronic pain G89.29    Bipolar disorder in partial remission, most recent episode unspecified  type (Multi) F31.70              Electronically Signed By: Jacqui Wesley MD   8/1/24  4:58 PM

## 2024-07-31 ENCOUNTER — NURSING HOME VISIT (OUTPATIENT)
Dept: POST ACUTE CARE | Facility: EXTERNAL LOCATION | Age: 69
End: 2024-07-31
Payer: MEDICARE

## 2024-07-31 DIAGNOSIS — E78.5 HYPERLIPIDEMIA, UNSPECIFIED HYPERLIPIDEMIA TYPE: ICD-10-CM

## 2024-07-31 DIAGNOSIS — E66.01 MORBID OBESITY (MULTI): ICD-10-CM

## 2024-07-31 DIAGNOSIS — F31.70 BIPOLAR DISORDER IN PARTIAL REMISSION, MOST RECENT EPISODE UNSPECIFIED TYPE (MULTI): ICD-10-CM

## 2024-07-31 DIAGNOSIS — R53.81 DEBILITY: Primary | ICD-10-CM

## 2024-07-31 PROCEDURE — 99308 SBSQ NF CARE LOW MDM 20: CPT | Performed by: FAMILY MEDICINE

## 2024-07-31 NOTE — LETTER
Patient: Fanny Charles  : 1955    Encounter Date: 2024    Subjective  Patient ID: Fanny Charles is a 69 y.o. female skilled nursing home patientwho presents for No chief complaint on file..    Debility, htn, obesity, hld, bipolar disorder         Review of Systems   Reason unable to perform ROS: debility.       Objective  There were no vitals taken for this visit.    Physical Exam  Vitals and nursing note reviewed.   Constitutional:       Appearance: She is obese. She is ill-appearing.   HENT:      Head: Normocephalic and atraumatic.      Nose: Nose normal.      Mouth/Throat:      Mouth: Mucous membranes are moist.      Pharynx: Oropharynx is clear.   Eyes:      Extraocular Movements: Extraocular movements intact.      Conjunctiva/sclera: Conjunctivae normal.      Pupils: Pupils are equal, round, and reactive to light.   Cardiovascular:      Rate and Rhythm: Normal rate and regular rhythm.      Pulses: Normal pulses.   Pulmonary:      Effort: Pulmonary effort is normal.      Breath sounds: Normal breath sounds.   Abdominal:      General: Bowel sounds are normal.      Palpations: Abdomen is soft.   Musculoskeletal:         General: Tenderness present. Normal range of motion.      Cervical back: Normal range of motion and neck supple.   Skin:     General: Skin is warm and dry.      Capillary Refill: Capillary refill takes less than 2 seconds.      Coloration: Skin is pale.   Neurological:      Mental Status: She is alert. Mental status is at baseline.      Sensory: Sensory deficit present.      Motor: Weakness present.      Coordination: Coordination abnormal.      Gait: Gait abnormal.      Deep Tendon Reflexes: Reflexes abnormal.   Psychiatric:         Attention and Perception: She is inattentive.         Mood and Affect: Affect is flat.         Behavior: Behavior normal.         Thought Content: Thought content normal.         Judgment: Judgment normal.         Assessment/Plan             Electronically  Signed By: Jacqui Wesley MD   8/1/24  5:01 PM

## 2024-08-01 NOTE — PROGRESS NOTES
Subjective   Patient ID: Fanny Charles is a 69 y.o. female who presents for No chief complaint on file..    Debility, bipolar depression, htn, hld         Review of Systems   Reason unable to perform ROS: debility.       Objective   There were no vitals taken for this visit.    Physical Exam  Vitals and nursing note reviewed.   Constitutional:       Appearance: She is ill-appearing.   HENT:      Head: Normocephalic and atraumatic.      Nose: Nose normal.      Mouth/Throat:      Mouth: Mucous membranes are moist.      Pharynx: Oropharynx is clear.   Eyes:      Extraocular Movements: Extraocular movements intact.      Conjunctiva/sclera: Conjunctivae normal.      Pupils: Pupils are equal, round, and reactive to light.   Cardiovascular:      Rate and Rhythm: Normal rate and regular rhythm.      Pulses: Normal pulses.   Pulmonary:      Effort: Pulmonary effort is normal.      Breath sounds: Normal breath sounds.   Abdominal:      General: Bowel sounds are normal.      Palpations: Abdomen is soft.   Musculoskeletal:         General: Tenderness present. Normal range of motion.      Cervical back: Normal range of motion and neck supple.   Skin:     General: Skin is warm and dry.      Capillary Refill: Capillary refill takes less than 2 seconds.      Coloration: Skin is pale.   Neurological:      General: No focal deficit present.      Mental Status: She is alert.      Motor: Weakness present.      Coordination: Coordination abnormal.      Gait: Gait abnormal.      Deep Tendon Reflexes: Reflexes abnormal.   Psychiatric:         Mood and Affect: Affect is flat.         Assessment/Plan   Problem List Items Addressed This Visit             ICD-10-CM    Debility - Primary R53.81    HTN (hypertension) I10    Morbid obesity (Multi) E66.01    HLD (hyperlipidemia) E78.5    Chronic pain G89.29    Bipolar disorder in partial remission, most recent episode unspecified type (Multi) F31.70

## 2024-08-01 NOTE — PROGRESS NOTES
Subjective   Patient ID: Fanny Charles is a 69 y.o. female skilled nursing home patientwho presents for No chief complaint on file..    Debility, htn, obesity, hld, bipolar disorder         Review of Systems   Reason unable to perform ROS: debility.       Objective   There were no vitals taken for this visit.    Physical Exam  Vitals and nursing note reviewed.   Constitutional:       Appearance: She is obese. She is ill-appearing.   HENT:      Head: Normocephalic and atraumatic.      Nose: Nose normal.      Mouth/Throat:      Mouth: Mucous membranes are moist.      Pharynx: Oropharynx is clear.   Eyes:      Extraocular Movements: Extraocular movements intact.      Conjunctiva/sclera: Conjunctivae normal.      Pupils: Pupils are equal, round, and reactive to light.   Cardiovascular:      Rate and Rhythm: Normal rate and regular rhythm.      Pulses: Normal pulses.   Pulmonary:      Effort: Pulmonary effort is normal.      Breath sounds: Normal breath sounds.   Abdominal:      General: Bowel sounds are normal.      Palpations: Abdomen is soft.   Musculoskeletal:         General: Tenderness present. Normal range of motion.      Cervical back: Normal range of motion and neck supple.   Skin:     General: Skin is warm and dry.      Capillary Refill: Capillary refill takes less than 2 seconds.      Coloration: Skin is pale.   Neurological:      Mental Status: She is alert. Mental status is at baseline.      Sensory: Sensory deficit present.      Motor: Weakness present.      Coordination: Coordination abnormal.      Gait: Gait abnormal.      Deep Tendon Reflexes: Reflexes abnormal.   Psychiatric:         Attention and Perception: She is inattentive.         Mood and Affect: Affect is flat.         Behavior: Behavior normal.         Thought Content: Thought content normal.         Judgment: Judgment normal.         Assessment/Plan

## 2024-08-12 ENCOUNTER — NURSING HOME VISIT (OUTPATIENT)
Dept: POST ACUTE CARE | Facility: EXTERNAL LOCATION | Age: 69
End: 2024-08-12
Payer: MEDICARE

## 2024-08-12 DIAGNOSIS — I10 HYPERTENSION, UNSPECIFIED TYPE: ICD-10-CM

## 2024-08-12 DIAGNOSIS — R53.81 DEBILITY: Primary | ICD-10-CM

## 2024-08-12 DIAGNOSIS — E66.01 MORBID OBESITY (MULTI): ICD-10-CM

## 2024-08-12 DIAGNOSIS — G89.4 CHRONIC PAIN SYNDROME: ICD-10-CM

## 2024-08-12 DIAGNOSIS — F31.70 BIPOLAR DISORDER IN PARTIAL REMISSION, MOST RECENT EPISODE UNSPECIFIED TYPE (MULTI): ICD-10-CM

## 2024-08-12 DIAGNOSIS — E78.5 HYPERLIPIDEMIA, UNSPECIFIED HYPERLIPIDEMIA TYPE: ICD-10-CM

## 2024-08-12 PROCEDURE — 99308 SBSQ NF CARE LOW MDM 20: CPT | Performed by: FAMILY MEDICINE

## 2024-08-12 NOTE — LETTER
Patient: Fanny Charles  : 1955    Encounter Date: 2024    Subjective  Patient ID: Fanny Charles is a 69 y.o. female who is long term resident being seen and evaluated for multiple medical problems.    Bipolar disorder, morbid obesity, htn, hld         Review of Systems   Reason unable to perform ROS: debility.       Objective  There were no vitals taken for this visit.    Physical Exam  Vitals and nursing note reviewed.   Constitutional:       Appearance: She is obese. She is ill-appearing.   HENT:      Head: Normocephalic and atraumatic.      Nose: Nose normal.      Mouth/Throat:      Mouth: Mucous membranes are moist.      Pharynx: Oropharynx is clear.   Eyes:      Extraocular Movements: Extraocular movements intact.      Conjunctiva/sclera: Conjunctivae normal.      Pupils: Pupils are equal, round, and reactive to light.   Cardiovascular:      Rate and Rhythm: Normal rate and regular rhythm.      Pulses: Normal pulses.   Pulmonary:      Effort: Pulmonary effort is normal.      Breath sounds: Normal breath sounds.   Abdominal:      General: Bowel sounds are normal.      Palpations: Abdomen is soft.   Musculoskeletal:         General: Tenderness present. Normal range of motion.      Cervical back: Normal range of motion and neck supple.   Skin:     General: Skin is warm and dry.      Capillary Refill: Capillary refill takes less than 2 seconds.      Coloration: Skin is pale.   Neurological:      Mental Status: She is alert. Mental status is at baseline.      Motor: Weakness present.      Gait: Gait abnormal.   Psychiatric:         Mood and Affect: Affect is flat.         Behavior: Behavior normal.         Thought Content: Thought content normal.         Judgment: Judgment normal.         Assessment/Plan  Problem List Items Addressed This Visit             ICD-10-CM    Debility - Primary R53.81    HTN (hypertension) I10    Morbid obesity (Multi) E66.01    HLD (hyperlipidemia) E78.5    Chronic pain  G89.29    Bipolar disorder in partial remission, most recent episode unspecified type (Multi) F31.70        Goals    None           Electronically Signed By: Jacqui Wesley MD   8/13/24  2:34 PM

## 2024-08-13 NOTE — PROGRESS NOTES
Subjective   Patient ID: Fanny Charles is a 69 y.o. female who is long term resident being seen and evaluated for multiple medical problems.    Bipolar disorder, morbid obesity, htn, hld         Review of Systems   Reason unable to perform ROS: debility.       Objective   There were no vitals taken for this visit.    Physical Exam  Vitals and nursing note reviewed.   Constitutional:       Appearance: She is obese. She is ill-appearing.   HENT:      Head: Normocephalic and atraumatic.      Nose: Nose normal.      Mouth/Throat:      Mouth: Mucous membranes are moist.      Pharynx: Oropharynx is clear.   Eyes:      Extraocular Movements: Extraocular movements intact.      Conjunctiva/sclera: Conjunctivae normal.      Pupils: Pupils are equal, round, and reactive to light.   Cardiovascular:      Rate and Rhythm: Normal rate and regular rhythm.      Pulses: Normal pulses.   Pulmonary:      Effort: Pulmonary effort is normal.      Breath sounds: Normal breath sounds.   Abdominal:      General: Bowel sounds are normal.      Palpations: Abdomen is soft.   Musculoskeletal:         General: Tenderness present. Normal range of motion.      Cervical back: Normal range of motion and neck supple.   Skin:     General: Skin is warm and dry.      Capillary Refill: Capillary refill takes less than 2 seconds.      Coloration: Skin is pale.   Neurological:      Mental Status: She is alert. Mental status is at baseline.      Motor: Weakness present.      Gait: Gait abnormal.   Psychiatric:         Mood and Affect: Affect is flat.         Behavior: Behavior normal.         Thought Content: Thought content normal.         Judgment: Judgment normal.         Assessment/Plan   Problem List Items Addressed This Visit             ICD-10-CM    Debility - Primary R53.81    HTN (hypertension) I10    Morbid obesity (Multi) E66.01    HLD (hyperlipidemia) E78.5    Chronic pain G89.29    Bipolar disorder in partial remission, most recent episode  unspecified type (Multi) F31.70        Goals    None

## 2024-08-30 ENCOUNTER — NURSING HOME VISIT (OUTPATIENT)
Dept: POST ACUTE CARE | Facility: EXTERNAL LOCATION | Age: 69
End: 2024-08-30
Payer: MEDICARE

## 2024-08-30 DIAGNOSIS — E78.5 HYPERLIPIDEMIA, UNSPECIFIED HYPERLIPIDEMIA TYPE: ICD-10-CM

## 2024-08-30 DIAGNOSIS — R53.81 DEBILITY: Primary | ICD-10-CM

## 2024-08-30 DIAGNOSIS — I10 HYPERTENSION, UNSPECIFIED TYPE: ICD-10-CM

## 2024-08-30 DIAGNOSIS — E66.01 MORBID OBESITY (MULTI): ICD-10-CM

## 2024-08-30 DIAGNOSIS — G89.4 CHRONIC PAIN SYNDROME: ICD-10-CM

## 2024-08-30 DIAGNOSIS — F31.70 BIPOLAR DISORDER IN PARTIAL REMISSION, MOST RECENT EPISODE UNSPECIFIED TYPE (MULTI): ICD-10-CM

## 2024-08-30 PROCEDURE — 99308 SBSQ NF CARE LOW MDM 20: CPT | Performed by: FAMILY MEDICINE

## 2024-08-30 NOTE — LETTER
Patient: Fanny Charles  : 1955    Encounter Date: 2024    Subjective  Patient ID: Fanny Charles is a 69 y.o. female who is long term resident being seen and evaluated for multiple medical problems.    Debility, bipolar depression, htn, hld, chronic pain, morbid obesity         Review of Systems   Reason unable to perform ROS: debility.       Objective  There were no vitals taken for this visit.    Physical Exam  Vitals and nursing note reviewed.   Constitutional:       Appearance: She is obese. She is ill-appearing.   HENT:      Head: Normocephalic and atraumatic.      Nose: Nose normal.      Mouth/Throat:      Mouth: Mucous membranes are moist.      Pharynx: Oropharynx is clear.   Eyes:      Extraocular Movements: Extraocular movements intact.      Conjunctiva/sclera: Conjunctivae normal.      Pupils: Pupils are equal, round, and reactive to light.   Cardiovascular:      Rate and Rhythm: Normal rate and regular rhythm.      Pulses: Normal pulses.   Pulmonary:      Effort: Pulmonary effort is normal.      Breath sounds: Normal breath sounds.   Abdominal:      General: Bowel sounds are normal.      Palpations: Abdomen is soft.   Musculoskeletal:         General: Tenderness present. Normal range of motion.      Cervical back: Normal range of motion and neck supple.   Skin:     General: Skin is warm and dry.      Capillary Refill: Capillary refill takes less than 2 seconds.      Coloration: Skin is pale.   Neurological:      Mental Status: Mental status is at baseline.      Sensory: Sensory deficit present.      Motor: Weakness present.      Coordination: Coordination abnormal.      Gait: Gait abnormal.      Deep Tendon Reflexes: Reflexes abnormal.   Psychiatric:         Attention and Perception: She is inattentive.         Mood and Affect: Mood is depressed. Affect is flat.         Behavior: Behavior normal.         Thought Content: Thought content normal.         Judgment: Judgment normal.          Assessment/Plan  Problem List Items Addressed This Visit             ICD-10-CM    Debility - Primary R53.81    HTN (hypertension) I10    Morbid obesity (Multi) E66.01    HLD (hyperlipidemia) E78.5    Chronic pain G89.29    Bipolar disorder in partial remission, most recent episode unspecified type (Multi) F31.70        Goals    None           Electronically Signed By: Jacqui Wesley MD   9/3/24  2:29 PM

## 2024-09-03 NOTE — PROGRESS NOTES
Subjective   Patient ID: Fanny Charles is a 69 y.o. female who is long term resident being seen and evaluated for multiple medical problems.    Debility, bipolar depression, htn, hld, chronic pain, morbid obesity         Review of Systems   Reason unable to perform ROS: debility.       Objective   There were no vitals taken for this visit.    Physical Exam  Vitals and nursing note reviewed.   Constitutional:       Appearance: She is obese. She is ill-appearing.   HENT:      Head: Normocephalic and atraumatic.      Nose: Nose normal.      Mouth/Throat:      Mouth: Mucous membranes are moist.      Pharynx: Oropharynx is clear.   Eyes:      Extraocular Movements: Extraocular movements intact.      Conjunctiva/sclera: Conjunctivae normal.      Pupils: Pupils are equal, round, and reactive to light.   Cardiovascular:      Rate and Rhythm: Normal rate and regular rhythm.      Pulses: Normal pulses.   Pulmonary:      Effort: Pulmonary effort is normal.      Breath sounds: Normal breath sounds.   Abdominal:      General: Bowel sounds are normal.      Palpations: Abdomen is soft.   Musculoskeletal:         General: Tenderness present. Normal range of motion.      Cervical back: Normal range of motion and neck supple.   Skin:     General: Skin is warm and dry.      Capillary Refill: Capillary refill takes less than 2 seconds.      Coloration: Skin is pale.   Neurological:      Mental Status: Mental status is at baseline.      Sensory: Sensory deficit present.      Motor: Weakness present.      Coordination: Coordination abnormal.      Gait: Gait abnormal.      Deep Tendon Reflexes: Reflexes abnormal.   Psychiatric:         Attention and Perception: She is inattentive.         Mood and Affect: Mood is depressed. Affect is flat.         Behavior: Behavior normal.         Thought Content: Thought content normal.         Judgment: Judgment normal.         Assessment/Plan   Problem List Items Addressed This Visit              ICD-10-CM    Debility - Primary R53.81    HTN (hypertension) I10    Morbid obesity (Multi) E66.01    HLD (hyperlipidemia) E78.5    Chronic pain G89.29    Bipolar disorder in partial remission, most recent episode unspecified type (Multi) F31.70        Goals    None

## 2024-09-13 ENCOUNTER — NURSING HOME VISIT (OUTPATIENT)
Dept: POST ACUTE CARE | Facility: EXTERNAL LOCATION | Age: 69
End: 2024-09-13
Payer: MEDICARE

## 2024-09-13 DIAGNOSIS — E78.5 HYPERLIPIDEMIA, UNSPECIFIED HYPERLIPIDEMIA TYPE: ICD-10-CM

## 2024-09-13 DIAGNOSIS — R53.81 DEBILITY: ICD-10-CM

## 2024-09-13 DIAGNOSIS — F31.70 BIPOLAR DISORDER IN PARTIAL REMISSION, MOST RECENT EPISODE UNSPECIFIED TYPE (MULTI): ICD-10-CM

## 2024-09-13 DIAGNOSIS — G89.4 CHRONIC PAIN SYNDROME: ICD-10-CM

## 2024-09-13 DIAGNOSIS — I10 HYPERTENSION, UNSPECIFIED TYPE: ICD-10-CM

## 2024-09-13 DIAGNOSIS — E66.01 MORBID OBESITY (MULTI): Primary | ICD-10-CM

## 2024-09-13 PROCEDURE — 99308 SBSQ NF CARE LOW MDM 20: CPT | Performed by: FAMILY MEDICINE

## 2024-09-13 NOTE — LETTER
Patient: Fanny Charles  : 1955    Encounter Date: 2024    Subjective  Patient ID: Fanny Charles is a 69 y.o. female who is long term resident being seen and evaluated for multiple medical problems.    Morbid obesity, bipolar depression, debility, chronic pain, hld, htn         Review of Systems   Reason unable to perform ROS: debility.       Objective  There were no vitals taken for this visit.    Physical Exam  Vitals and nursing note reviewed.   Constitutional:       Appearance: She is obese. She is ill-appearing.   HENT:      Head: Normocephalic and atraumatic.      Nose: Nose normal.      Mouth/Throat:      Mouth: Mucous membranes are moist.      Pharynx: Oropharynx is clear.   Eyes:      Extraocular Movements: Extraocular movements intact.      Conjunctiva/sclera: Conjunctivae normal.      Pupils: Pupils are equal, round, and reactive to light.   Cardiovascular:      Rate and Rhythm: Normal rate and regular rhythm.      Pulses: Normal pulses.   Pulmonary:      Effort: Pulmonary effort is normal.      Breath sounds: Normal breath sounds.   Abdominal:      General: Bowel sounds are normal.      Palpations: Abdomen is soft.   Musculoskeletal:         General: Tenderness present. Normal range of motion.      Cervical back: Normal range of motion and neck supple.   Skin:     General: Skin is warm and dry.      Capillary Refill: Capillary refill takes less than 2 seconds.      Coloration: Skin is pale.   Neurological:      Mental Status: Mental status is at baseline.      Sensory: Sensory deficit present.      Motor: Weakness present.      Coordination: Coordination abnormal.      Gait: Gait abnormal.      Deep Tendon Reflexes: Reflexes abnormal.   Psychiatric:         Mood and Affect: Mood is depressed.         Behavior: Behavior is slowed.         Cognition and Memory: Cognition is impaired. Memory is impaired. She exhibits impaired recent memory and impaired remote memory.          Assessment/Plan  Problem List Items Addressed This Visit             ICD-10-CM    Debility R53.81    HTN (hypertension) I10    Morbid obesity (Multi) - Primary E66.01    HLD (hyperlipidemia) E78.5    Chronic pain G89.29    Bipolar disorder in partial remission, most recent episode unspecified type (Multi) F31.70        Goals    None           Electronically Signed By: Jacqui Wesley MD   9/13/24  8:40 AM

## 2024-09-13 NOTE — PROGRESS NOTES
Subjective   Patient ID: Fanny Charles is a 69 y.o. female who is long term resident being seen and evaluated for multiple medical problems.    Morbid obesity, bipolar depression, debility, chronic pain, hld, htn         Review of Systems   Reason unable to perform ROS: debility.       Objective   There were no vitals taken for this visit.    Physical Exam  Vitals and nursing note reviewed.   Constitutional:       Appearance: She is obese. She is ill-appearing.   HENT:      Head: Normocephalic and atraumatic.      Nose: Nose normal.      Mouth/Throat:      Mouth: Mucous membranes are moist.      Pharynx: Oropharynx is clear.   Eyes:      Extraocular Movements: Extraocular movements intact.      Conjunctiva/sclera: Conjunctivae normal.      Pupils: Pupils are equal, round, and reactive to light.   Cardiovascular:      Rate and Rhythm: Normal rate and regular rhythm.      Pulses: Normal pulses.   Pulmonary:      Effort: Pulmonary effort is normal.      Breath sounds: Normal breath sounds.   Abdominal:      General: Bowel sounds are normal.      Palpations: Abdomen is soft.   Musculoskeletal:         General: Tenderness present. Normal range of motion.      Cervical back: Normal range of motion and neck supple.   Skin:     General: Skin is warm and dry.      Capillary Refill: Capillary refill takes less than 2 seconds.      Coloration: Skin is pale.   Neurological:      Mental Status: Mental status is at baseline.      Sensory: Sensory deficit present.      Motor: Weakness present.      Coordination: Coordination abnormal.      Gait: Gait abnormal.      Deep Tendon Reflexes: Reflexes abnormal.   Psychiatric:         Mood and Affect: Mood is depressed.         Behavior: Behavior is slowed.         Cognition and Memory: Cognition is impaired. Memory is impaired. She exhibits impaired recent memory and impaired remote memory.         Assessment/Plan   Problem List Items Addressed This Visit             ICD-10-CM     Debility R53.81    HTN (hypertension) I10    Morbid obesity (Multi) - Primary E66.01    HLD (hyperlipidemia) E78.5    Chronic pain G89.29    Bipolar disorder in partial remission, most recent episode unspecified type (Multi) F31.70        Goals    None

## 2024-09-23 ENCOUNTER — NURSING HOME VISIT (OUTPATIENT)
Dept: POST ACUTE CARE | Facility: EXTERNAL LOCATION | Age: 69
End: 2024-09-23
Payer: MEDICARE

## 2024-09-23 DIAGNOSIS — R53.81 DEBILITY: Primary | ICD-10-CM

## 2024-09-23 DIAGNOSIS — F31.70 BIPOLAR DISORDER IN PARTIAL REMISSION, MOST RECENT EPISODE UNSPECIFIED TYPE (MULTI): ICD-10-CM

## 2024-09-23 DIAGNOSIS — I10 HYPERTENSION, UNSPECIFIED TYPE: ICD-10-CM

## 2024-09-23 DIAGNOSIS — G89.4 CHRONIC PAIN SYNDROME: ICD-10-CM

## 2024-09-23 DIAGNOSIS — E66.01 MORBID OBESITY (MULTI): ICD-10-CM

## 2024-09-23 DIAGNOSIS — E78.5 HYPERLIPIDEMIA, UNSPECIFIED HYPERLIPIDEMIA TYPE: ICD-10-CM

## 2024-09-23 PROCEDURE — 99308 SBSQ NF CARE LOW MDM 20: CPT | Performed by: FAMILY MEDICINE

## 2024-09-23 NOTE — PROGRESS NOTES
Subjective   Patient ID: Fanny Charles is a 69 y.o. female who is long term resident being seen and evaluated for multiple medical problems.    Bipolar depression, morbid obesity, debility, htn, hld. Chronic pain         Review of Systems   Reason unable to perform ROS: debility.       Objective   There were no vitals taken for this visit.    Physical Exam  Vitals and nursing note reviewed.   Constitutional:       Appearance: She is obese. She is ill-appearing.   HENT:      Head: Normocephalic and atraumatic.      Nose: Nose normal.      Mouth/Throat:      Mouth: Mucous membranes are moist.      Pharynx: Oropharynx is clear.   Eyes:      Extraocular Movements: Extraocular movements intact.      Conjunctiva/sclera: Conjunctivae normal.      Pupils: Pupils are equal, round, and reactive to light.   Cardiovascular:      Rate and Rhythm: Normal rate and regular rhythm.      Pulses: Normal pulses.   Pulmonary:      Effort: Pulmonary effort is normal.      Breath sounds: Normal breath sounds.   Abdominal:      General: Bowel sounds are normal.      Palpations: Abdomen is soft.   Musculoskeletal:         General: Tenderness present. Normal range of motion.      Cervical back: Normal range of motion and neck supple.   Skin:     General: Skin is warm and dry.      Capillary Refill: Capillary refill takes less than 2 seconds.      Coloration: Skin is pale.   Neurological:      Mental Status: Mental status is at baseline.      Sensory: Sensory deficit present.      Motor: Weakness present.      Coordination: Coordination abnormal.      Gait: Gait abnormal.      Deep Tendon Reflexes: Reflexes abnormal.   Psychiatric:         Attention and Perception: She is inattentive.         Mood and Affect: Affect is flat.         Behavior: Behavior normal.         Thought Content: Thought content normal.         Cognition and Memory: Cognition is impaired.         Judgment: Judgment normal.         Assessment/Plan   Problem List Items  Addressed This Visit             ICD-10-CM    Debility - Primary R53.81    HTN (hypertension) I10    Morbid obesity (Multi) E66.01    HLD (hyperlipidemia) E78.5    Chronic pain G89.29    Bipolar disorder in partial remission, most recent episode unspecified type (Multi) F31.70        Goals    None

## 2024-09-23 NOTE — LETTER
Patient: Fanny Charles  : 1955    Encounter Date: 2024    Subjective  Patient ID: Fanny Charles is a 69 y.o. female who is long term resident being seen and evaluated for multiple medical problems.    Bipolar depression, morbid obesity, debility, htn, hld. Chronic pain         Review of Systems   Reason unable to perform ROS: debility.       Objective  There were no vitals taken for this visit.    Physical Exam  Vitals and nursing note reviewed.   Constitutional:       Appearance: She is obese. She is ill-appearing.   HENT:      Head: Normocephalic and atraumatic.      Nose: Nose normal.      Mouth/Throat:      Mouth: Mucous membranes are moist.      Pharynx: Oropharynx is clear.   Eyes:      Extraocular Movements: Extraocular movements intact.      Conjunctiva/sclera: Conjunctivae normal.      Pupils: Pupils are equal, round, and reactive to light.   Cardiovascular:      Rate and Rhythm: Normal rate and regular rhythm.      Pulses: Normal pulses.   Pulmonary:      Effort: Pulmonary effort is normal.      Breath sounds: Normal breath sounds.   Abdominal:      General: Bowel sounds are normal.      Palpations: Abdomen is soft.   Musculoskeletal:         General: Tenderness present. Normal range of motion.      Cervical back: Normal range of motion and neck supple.   Skin:     General: Skin is warm and dry.      Capillary Refill: Capillary refill takes less than 2 seconds.      Coloration: Skin is pale.   Neurological:      Mental Status: Mental status is at baseline.      Sensory: Sensory deficit present.      Motor: Weakness present.      Coordination: Coordination abnormal.      Gait: Gait abnormal.      Deep Tendon Reflexes: Reflexes abnormal.   Psychiatric:         Attention and Perception: She is inattentive.         Mood and Affect: Affect is flat.         Behavior: Behavior normal.         Thought Content: Thought content normal.         Cognition and Memory: Cognition is impaired.          Judgment: Judgment normal.         Assessment/Plan  Problem List Items Addressed This Visit             ICD-10-CM    Debility - Primary R53.81    HTN (hypertension) I10    Morbid obesity (Multi) E66.01    HLD (hyperlipidemia) E78.5    Chronic pain G89.29    Bipolar disorder in partial remission, most recent episode unspecified type (Multi) F31.70        Goals    None           Electronically Signed By: Jacqui Wesley MD   9/23/24 10:03 AM   Statement Selected

## 2024-10-07 ENCOUNTER — NURSING HOME VISIT (OUTPATIENT)
Dept: POST ACUTE CARE | Facility: EXTERNAL LOCATION | Age: 69
End: 2024-10-07
Payer: MEDICARE

## 2024-10-07 DIAGNOSIS — E66.01 MORBID OBESITY (MULTI): ICD-10-CM

## 2024-10-07 DIAGNOSIS — F31.70 BIPOLAR DISORDER IN PARTIAL REMISSION, MOST RECENT EPISODE UNSPECIFIED TYPE (MULTI): ICD-10-CM

## 2024-10-07 DIAGNOSIS — I10 HYPERTENSION, UNSPECIFIED TYPE: ICD-10-CM

## 2024-10-07 DIAGNOSIS — G89.4 CHRONIC PAIN SYNDROME: ICD-10-CM

## 2024-10-07 DIAGNOSIS — R53.81 DEBILITY: Primary | ICD-10-CM

## 2024-10-07 DIAGNOSIS — E78.5 HYPERLIPIDEMIA, UNSPECIFIED HYPERLIPIDEMIA TYPE: ICD-10-CM

## 2024-10-07 PROCEDURE — 99308 SBSQ NF CARE LOW MDM 20: CPT | Performed by: FAMILY MEDICINE

## 2024-10-07 NOTE — PROGRESS NOTES
Subjective   Patient ID: Fanny Charles is a 69 y.o. female who is long term resident being seen and evaluated for multiple medical problems.    Debility, morbid obesity, bipolar depression, hld, chronic pain syndrome, htn         Review of Systems   Reason unable to perform ROS: debility.       Objective   There were no vitals taken for this visit.    Physical Exam  Vitals and nursing note reviewed.   Constitutional:       Appearance: She is obese. She is ill-appearing.   HENT:      Head: Normocephalic and atraumatic.      Nose: Nose normal.      Mouth/Throat:      Mouth: Mucous membranes are moist.      Pharynx: Oropharynx is clear.   Eyes:      Extraocular Movements: Extraocular movements intact.      Conjunctiva/sclera: Conjunctivae normal.      Pupils: Pupils are equal, round, and reactive to light.   Cardiovascular:      Rate and Rhythm: Normal rate and regular rhythm.      Pulses: Normal pulses.   Pulmonary:      Effort: Pulmonary effort is normal.      Breath sounds: Normal breath sounds.   Abdominal:      General: Bowel sounds are normal.      Palpations: Abdomen is soft.   Musculoskeletal:         General: Tenderness present. Normal range of motion.      Cervical back: Normal range of motion and neck supple.   Skin:     General: Skin is warm and dry.      Capillary Refill: Capillary refill takes less than 2 seconds.      Coloration: Skin is pale.   Neurological:      Mental Status: Mental status is at baseline.      Sensory: Sensory deficit present.      Motor: Weakness present.      Coordination: Coordination abnormal.      Gait: Gait abnormal.      Deep Tendon Reflexes: Reflexes abnormal.   Psychiatric:         Mood and Affect: Mood is depressed.         Cognition and Memory: Cognition is impaired.         Assessment/Plan   Problem List Items Addressed This Visit    None       Goals    None

## 2024-10-07 NOTE — LETTER
Patient: Fanny Charles  : 1955    Encounter Date: 10/07/2024    Subjective  Patient ID: Fanny Charles is a 69 y.o. female who is long term resident being seen and evaluated for multiple medical problems.    Debility, morbid obesity, bipolar depression, hld, chronic pain syndrome, htn         Review of Systems   Reason unable to perform ROS: debility.       Objective  There were no vitals taken for this visit.    Physical Exam  Vitals and nursing note reviewed.   Constitutional:       Appearance: She is obese. She is ill-appearing.   HENT:      Head: Normocephalic and atraumatic.      Nose: Nose normal.      Mouth/Throat:      Mouth: Mucous membranes are moist.      Pharynx: Oropharynx is clear.   Eyes:      Extraocular Movements: Extraocular movements intact.      Conjunctiva/sclera: Conjunctivae normal.      Pupils: Pupils are equal, round, and reactive to light.   Cardiovascular:      Rate and Rhythm: Normal rate and regular rhythm.      Pulses: Normal pulses.   Pulmonary:      Effort: Pulmonary effort is normal.      Breath sounds: Normal breath sounds.   Abdominal:      General: Bowel sounds are normal.      Palpations: Abdomen is soft.   Musculoskeletal:         General: Tenderness present. Normal range of motion.      Cervical back: Normal range of motion and neck supple.   Skin:     General: Skin is warm and dry.      Capillary Refill: Capillary refill takes less than 2 seconds.      Coloration: Skin is pale.   Neurological:      Mental Status: Mental status is at baseline.      Sensory: Sensory deficit present.      Motor: Weakness present.      Coordination: Coordination abnormal.      Gait: Gait abnormal.      Deep Tendon Reflexes: Reflexes abnormal.   Psychiatric:         Mood and Affect: Mood is depressed.         Cognition and Memory: Cognition is impaired.         Assessment/Plan  Problem List Items Addressed This Visit    None       Goals    None           Electronically Signed By: Jacqui  MD Lupillo   10/7/24 10:35 AM

## 2024-10-28 ENCOUNTER — NURSING HOME VISIT (OUTPATIENT)
Dept: POST ACUTE CARE | Facility: EXTERNAL LOCATION | Age: 69
End: 2024-10-28
Payer: MEDICARE

## 2024-10-28 DIAGNOSIS — F31.70 BIPOLAR DISORDER IN PARTIAL REMISSION, MOST RECENT EPISODE UNSPECIFIED TYPE (MULTI): ICD-10-CM

## 2024-10-28 DIAGNOSIS — G89.4 CHRONIC PAIN SYNDROME: ICD-10-CM

## 2024-10-28 DIAGNOSIS — I10 HYPERTENSION, UNSPECIFIED TYPE: ICD-10-CM

## 2024-10-28 DIAGNOSIS — E66.01 MORBID OBESITY (MULTI): ICD-10-CM

## 2024-10-28 DIAGNOSIS — R53.81 DEBILITY: Primary | ICD-10-CM

## 2024-10-28 PROCEDURE — 99308 SBSQ NF CARE LOW MDM 20: CPT | Performed by: FAMILY MEDICINE

## 2024-10-28 NOTE — LETTER
Patient: Fanny Charles  : 1955    Encounter Date: 10/28/2024    Subjective  Patient ID: Fanny Charles is a 69 y.o. female who is long term resident being seen and evaluated for multiple medical problems.    Debility, htn, bipolar disorder, morbid obesity, chronic pain syndrome         Review of Systems   Reason unable to perform ROS: debility.       Objective  There were no vitals taken for this visit.    Physical Exam  Vitals and nursing note reviewed.   Constitutional:       Appearance: She is obese. She is ill-appearing.   HENT:      Head: Normocephalic and atraumatic.      Nose: Nose normal.      Mouth/Throat:      Mouth: Mucous membranes are moist.      Pharynx: Oropharynx is clear.   Eyes:      Extraocular Movements: Extraocular movements intact.      Conjunctiva/sclera: Conjunctivae normal.      Pupils: Pupils are equal, round, and reactive to light.   Cardiovascular:      Rate and Rhythm: Normal rate and regular rhythm.      Pulses: Normal pulses.   Pulmonary:      Effort: Pulmonary effort is normal.      Breath sounds: Normal breath sounds.   Abdominal:      General: Bowel sounds are normal.      Palpations: Abdomen is soft.   Musculoskeletal:         General: Tenderness present. Normal range of motion.      Cervical back: Normal range of motion and neck supple.   Skin:     General: Skin is warm and dry.      Capillary Refill: Capillary refill takes less than 2 seconds.      Coloration: Skin is pale.   Neurological:      Mental Status: Mental status is at baseline.      Motor: Weakness present.      Gait: Gait abnormal.   Psychiatric:         Attention and Perception: She is inattentive.         Mood and Affect: Affect is flat.         Behavior: Behavior normal.         Thought Content: Thought content normal.         Judgment: Judgment normal.         Assessment/Plan  Problem List Items Addressed This Visit             ICD-10-CM    Debility - Primary R53.81    HTN (hypertension) I10    Morbid  obesity (Multi) E66.01    Chronic pain G89.29    Bipolar disorder in partial remission, most recent episode unspecified type (Multi) F31.70        Goals    None           Electronically Signed By: Jacqui Wesley MD   11/4/24 12:45 PM

## 2024-11-04 NOTE — PROGRESS NOTES
Subjective   Patient ID: Fanny Charles is a 69 y.o. female who is long term resident being seen and evaluated for multiple medical problems.    Debility, htn, bipolar disorder, morbid obesity, chronic pain syndrome         Review of Systems   Reason unable to perform ROS: debility.       Objective   There were no vitals taken for this visit.    Physical Exam  Vitals and nursing note reviewed.   Constitutional:       Appearance: She is obese. She is ill-appearing.   HENT:      Head: Normocephalic and atraumatic.      Nose: Nose normal.      Mouth/Throat:      Mouth: Mucous membranes are moist.      Pharynx: Oropharynx is clear.   Eyes:      Extraocular Movements: Extraocular movements intact.      Conjunctiva/sclera: Conjunctivae normal.      Pupils: Pupils are equal, round, and reactive to light.   Cardiovascular:      Rate and Rhythm: Normal rate and regular rhythm.      Pulses: Normal pulses.   Pulmonary:      Effort: Pulmonary effort is normal.      Breath sounds: Normal breath sounds.   Abdominal:      General: Bowel sounds are normal.      Palpations: Abdomen is soft.   Musculoskeletal:         General: Tenderness present. Normal range of motion.      Cervical back: Normal range of motion and neck supple.   Skin:     General: Skin is warm and dry.      Capillary Refill: Capillary refill takes less than 2 seconds.      Coloration: Skin is pale.   Neurological:      Mental Status: Mental status is at baseline.      Motor: Weakness present.      Gait: Gait abnormal.   Psychiatric:         Attention and Perception: She is inattentive.         Mood and Affect: Affect is flat.         Behavior: Behavior normal.         Thought Content: Thought content normal.         Judgment: Judgment normal.         Assessment/Plan   Problem List Items Addressed This Visit             ICD-10-CM    Debility - Primary R53.81    HTN (hypertension) I10    Morbid obesity (Multi) E66.01    Chronic pain G89.29    Bipolar disorder in  partial remission, most recent episode unspecified type (Multi) F31.70        Goals    None

## 2024-11-11 ENCOUNTER — NURSING HOME VISIT (OUTPATIENT)
Dept: POST ACUTE CARE | Facility: EXTERNAL LOCATION | Age: 69
End: 2024-11-11
Payer: MEDICARE

## 2024-11-11 DIAGNOSIS — E11.65 TYPE 2 DIABETES MELLITUS WITH HYPERGLYCEMIA, UNSPECIFIED WHETHER LONG TERM INSULIN USE (MULTI): ICD-10-CM

## 2024-11-11 DIAGNOSIS — E66.01 MORBID OBESITY (MULTI): ICD-10-CM

## 2024-11-11 DIAGNOSIS — G89.4 CHRONIC PAIN SYNDROME: ICD-10-CM

## 2024-11-11 DIAGNOSIS — F31.70 BIPOLAR DISORDER IN PARTIAL REMISSION, MOST RECENT EPISODE UNSPECIFIED TYPE (MULTI): ICD-10-CM

## 2024-11-11 DIAGNOSIS — R53.81 DEBILITY: ICD-10-CM

## 2024-11-11 DIAGNOSIS — E78.5 HYPERLIPIDEMIA, UNSPECIFIED HYPERLIPIDEMIA TYPE: ICD-10-CM

## 2024-11-11 DIAGNOSIS — I10 HYPERTENSION, UNSPECIFIED TYPE: ICD-10-CM

## 2024-11-11 DIAGNOSIS — L97.929 NON-PRESSURE CHRONIC ULCER OF LEFT LOWER LEG, UNSPECIFIED ULCER STAGE (MULTI): Primary | ICD-10-CM

## 2024-11-11 PROCEDURE — 99308 SBSQ NF CARE LOW MDM 20: CPT | Performed by: FAMILY MEDICINE

## 2024-11-11 NOTE — LETTER
Patient: Fanny Charles  : 1955    Encounter Date: 2024    Subjective  Patient ID: Fanny Charles is a 69 y.o. female who is long term resident being seen and evaluated for multiple medical problems.    Debility, hld, obesity, bipolar depression, dm2, htn         Review of Systems   Reason unable to perform ROS: debility.       Objective  There were no vitals taken for this visit.    Physical Exam  Vitals and nursing note reviewed.   Constitutional:       Appearance: She is obese. She is ill-appearing.   HENT:      Head: Normocephalic and atraumatic.      Nose: Nose normal.      Mouth/Throat:      Mouth: Mucous membranes are moist.      Pharynx: Oropharynx is clear.   Eyes:      Extraocular Movements: Extraocular movements intact.      Conjunctiva/sclera: Conjunctivae normal.      Pupils: Pupils are equal, round, and reactive to light.   Cardiovascular:      Rate and Rhythm: Normal rate and regular rhythm.      Pulses: Normal pulses.   Pulmonary:      Effort: Pulmonary effort is normal.      Breath sounds: Normal breath sounds.   Abdominal:      General: Bowel sounds are normal.      Palpations: Abdomen is soft.   Musculoskeletal:         General: Tenderness present. Normal range of motion.      Cervical back: Normal range of motion and neck supple.   Skin:     General: Skin is warm and dry.      Capillary Refill: Capillary refill takes less than 2 seconds.      Coloration: Skin is pale.   Neurological:      Mental Status: Mental status is at baseline.      Motor: No weakness.      Gait: Gait normal.   Psychiatric:         Attention and Perception: She is inattentive.         Mood and Affect: Affect is flat.         Behavior: Behavior normal.         Thought Content: Thought content normal.         Cognition and Memory: Cognition is impaired.         Judgment: Judgment normal.         Assessment/Plan  Problem List Items Addressed This Visit             ICD-10-CM    Debility R53.81    HTN (hypertension)  I10    Morbid obesity (Multi) E66.01    HLD (hyperlipidemia) E78.5    Chronic pain G89.29    Bipolar disorder in partial remission, most recent episode unspecified type (Multi) F31.70    Non-pressure chronic ulcer of left lower leg, unspecified ulcer stage (Multi) - Primary L97.929    Type 2 diabetes mellitus with hyperglycemia, unspecified whether long term insulin use (Multi) E11.65        Goals    None           Electronically Signed By: Jacqui Wesley MD   11/11/24 11:18 AM

## 2024-11-11 NOTE — PROGRESS NOTES
Subjective   Patient ID: Fanny Charles is a 69 y.o. female who is long term resident being seen and evaluated for multiple medical problems.    Debility, hld, obesity, bipolar depression, dm2, htn         Review of Systems   Reason unable to perform ROS: debility.       Objective   There were no vitals taken for this visit.    Physical Exam  Vitals and nursing note reviewed.   Constitutional:       Appearance: She is obese. She is ill-appearing.   HENT:      Head: Normocephalic and atraumatic.      Nose: Nose normal.      Mouth/Throat:      Mouth: Mucous membranes are moist.      Pharynx: Oropharynx is clear.   Eyes:      Extraocular Movements: Extraocular movements intact.      Conjunctiva/sclera: Conjunctivae normal.      Pupils: Pupils are equal, round, and reactive to light.   Cardiovascular:      Rate and Rhythm: Normal rate and regular rhythm.      Pulses: Normal pulses.   Pulmonary:      Effort: Pulmonary effort is normal.      Breath sounds: Normal breath sounds.   Abdominal:      General: Bowel sounds are normal.      Palpations: Abdomen is soft.   Musculoskeletal:         General: Tenderness present. Normal range of motion.      Cervical back: Normal range of motion and neck supple.   Skin:     General: Skin is warm and dry.      Capillary Refill: Capillary refill takes less than 2 seconds.      Coloration: Skin is pale.   Neurological:      Mental Status: Mental status is at baseline.      Motor: No weakness.      Gait: Gait normal.   Psychiatric:         Attention and Perception: She is inattentive.         Mood and Affect: Affect is flat.         Behavior: Behavior normal.         Thought Content: Thought content normal.         Cognition and Memory: Cognition is impaired.         Judgment: Judgment normal.         Assessment/Plan   Problem List Items Addressed This Visit             ICD-10-CM    Debility R53.81    HTN (hypertension) I10    Morbid obesity (Multi) E66.01    HLD (hyperlipidemia) E78.5     Chronic pain G89.29    Bipolar disorder in partial remission, most recent episode unspecified type (Multi) F31.70    Non-pressure chronic ulcer of left lower leg, unspecified ulcer stage (Multi) - Primary L97.929    Type 2 diabetes mellitus with hyperglycemia, unspecified whether long term insulin use (Multi) E11.65        Goals    None

## 2024-11-21 ENCOUNTER — NURSING HOME VISIT (OUTPATIENT)
Dept: POST ACUTE CARE | Facility: EXTERNAL LOCATION | Age: 69
End: 2024-11-21
Payer: MEDICARE

## 2024-11-21 DIAGNOSIS — L97.929 NON-PRESSURE CHRONIC ULCER OF LEFT LOWER LEG, UNSPECIFIED ULCER STAGE (MULTI): ICD-10-CM

## 2024-11-21 DIAGNOSIS — G89.4 CHRONIC PAIN SYNDROME: ICD-10-CM

## 2024-11-21 DIAGNOSIS — E66.01 MORBID OBESITY (MULTI): ICD-10-CM

## 2024-11-21 DIAGNOSIS — R53.81 DEBILITY: Primary | ICD-10-CM

## 2024-11-21 DIAGNOSIS — F31.70 BIPOLAR DISORDER IN PARTIAL REMISSION, MOST RECENT EPISODE UNSPECIFIED TYPE (MULTI): ICD-10-CM

## 2024-11-21 DIAGNOSIS — E11.65 TYPE 2 DIABETES MELLITUS WITH HYPERGLYCEMIA, UNSPECIFIED WHETHER LONG TERM INSULIN USE (MULTI): ICD-10-CM

## 2024-11-21 DIAGNOSIS — I10 HYPERTENSION, UNSPECIFIED TYPE: ICD-10-CM

## 2024-11-21 PROCEDURE — 99308 SBSQ NF CARE LOW MDM 20: CPT | Performed by: FAMILY MEDICINE

## 2024-11-21 NOTE — LETTER
Patient: Fanny Charles  : 1955    Encounter Date: 2024    Subjective  Patient ID: Fanny Charles is a 69 y.o. female who is long term resident being seen and evaluated for multiple medical problems.    Debility, bipolar depression, htn, chrpnic pain         Review of Systems   Reason unable to perform ROS: debility.       Objective  There were no vitals taken for this visit.    Physical Exam  Vitals and nursing note reviewed.   Constitutional:       Appearance: She is obese. She is ill-appearing.   HENT:      Head: Normocephalic and atraumatic.      Nose: Nose normal.      Mouth/Throat:      Mouth: Mucous membranes are moist.      Pharynx: Oropharynx is clear.   Eyes:      Extraocular Movements: Extraocular movements intact.      Conjunctiva/sclera: Conjunctivae normal.      Pupils: Pupils are equal, round, and reactive to light.   Cardiovascular:      Rate and Rhythm: Normal rate and regular rhythm.      Pulses: Normal pulses.   Pulmonary:      Effort: Pulmonary effort is normal.      Breath sounds: Normal breath sounds.   Abdominal:      General: Bowel sounds are normal.      Palpations: Abdomen is soft.   Musculoskeletal:         General: Tenderness present. Normal range of motion.      Cervical back: Normal range of motion and neck supple.   Skin:     General: Skin is warm and dry.      Capillary Refill: Capillary refill takes less than 2 seconds.      Coloration: Skin is pale.   Neurological:      General: No focal deficit present.      Mental Status: Mental status is at baseline.      Motor: Weakness present.      Gait: Gait abnormal.   Psychiatric:         Attention and Perception: She is inattentive.         Mood and Affect: Affect is flat.         Behavior: Behavior normal.         Thought Content: Thought content normal.         Judgment: Judgment normal.         Assessment/Plan  Problem List Items Addressed This Visit             ICD-10-CM    Debility - Primary R53.81    HTN (hypertension) I10     Morbid obesity (Multi) E66.01    Chronic pain G89.29    Bipolar disorder in partial remission, most recent episode unspecified type (Multi) F31.70    Non-pressure chronic ulcer of left lower leg, unspecified ulcer stage (Multi) L97.929    Type 2 diabetes mellitus with hyperglycemia, unspecified whether long term insulin use (Multi) E11.65        Goals    None           Electronically Signed By: Jacqui Wesley MD   11/21/24  3:04 PM

## 2024-11-21 NOTE — PROGRESS NOTES
Subjective   Patient ID: Fanny Charles is a 69 y.o. female who is long term resident being seen and evaluated for multiple medical problems.    Debility, bipolar depression, htn, chrpnic pain         Review of Systems   Reason unable to perform ROS: debility.       Objective   There were no vitals taken for this visit.    Physical Exam  Vitals and nursing note reviewed.   Constitutional:       Appearance: She is obese. She is ill-appearing.   HENT:      Head: Normocephalic and atraumatic.      Nose: Nose normal.      Mouth/Throat:      Mouth: Mucous membranes are moist.      Pharynx: Oropharynx is clear.   Eyes:      Extraocular Movements: Extraocular movements intact.      Conjunctiva/sclera: Conjunctivae normal.      Pupils: Pupils are equal, round, and reactive to light.   Cardiovascular:      Rate and Rhythm: Normal rate and regular rhythm.      Pulses: Normal pulses.   Pulmonary:      Effort: Pulmonary effort is normal.      Breath sounds: Normal breath sounds.   Abdominal:      General: Bowel sounds are normal.      Palpations: Abdomen is soft.   Musculoskeletal:         General: Tenderness present. Normal range of motion.      Cervical back: Normal range of motion and neck supple.   Skin:     General: Skin is warm and dry.      Capillary Refill: Capillary refill takes less than 2 seconds.      Coloration: Skin is pale.   Neurological:      General: No focal deficit present.      Mental Status: Mental status is at baseline.      Motor: Weakness present.      Gait: Gait abnormal.   Psychiatric:         Attention and Perception: She is inattentive.         Mood and Affect: Affect is flat.         Behavior: Behavior normal.         Thought Content: Thought content normal.         Judgment: Judgment normal.         Assessment/Plan   Problem List Items Addressed This Visit             ICD-10-CM    Debility - Primary R53.81    HTN (hypertension) I10    Morbid obesity (Multi) E66.01    Chronic pain G89.29    Bipolar  disorder in partial remission, most recent episode unspecified type (Multi) F31.70    Non-pressure chronic ulcer of left lower leg, unspecified ulcer stage (Multi) L97.929    Type 2 diabetes mellitus with hyperglycemia, unspecified whether long term insulin use (Multi) E11.65        Goals    None

## 2024-11-29 ENCOUNTER — NURSING HOME VISIT (OUTPATIENT)
Dept: POST ACUTE CARE | Facility: EXTERNAL LOCATION | Age: 69
End: 2024-11-29
Payer: MEDICARE

## 2024-11-29 DIAGNOSIS — F31.70 BIPOLAR DISORDER IN PARTIAL REMISSION, MOST RECENT EPISODE UNSPECIFIED TYPE (MULTI): ICD-10-CM

## 2024-11-29 DIAGNOSIS — E11.65 TYPE 2 DIABETES MELLITUS WITH HYPERGLYCEMIA, UNSPECIFIED WHETHER LONG TERM INSULIN USE (MULTI): ICD-10-CM

## 2024-11-29 DIAGNOSIS — R53.81 DEBILITY: Primary | ICD-10-CM

## 2024-11-29 DIAGNOSIS — E66.01 MORBID OBESITY (MULTI): ICD-10-CM

## 2024-11-29 DIAGNOSIS — G89.4 CHRONIC PAIN SYNDROME: ICD-10-CM

## 2024-11-29 DIAGNOSIS — I10 HYPERTENSION, UNSPECIFIED TYPE: ICD-10-CM

## 2024-11-29 PROCEDURE — 99308 SBSQ NF CARE LOW MDM 20: CPT | Performed by: FAMILY MEDICINE

## 2024-11-29 NOTE — PROGRESS NOTES
Subjective   Patient ID: Fanny Charles is a 69 y.o. female who is long term resident being seen and evaluated for multiple medical problems.    DM2, bipolar depression, morbid obesity, htn, debility         Review of Systems   Reason unable to perform ROS: debility.       Objective   There were no vitals taken for this visit.    Physical Exam  Vitals and nursing note reviewed.   Constitutional:       Appearance: She is obese. She is ill-appearing.   HENT:      Head: Normocephalic and atraumatic.      Nose: Nose normal.      Mouth/Throat:      Mouth: Mucous membranes are moist.      Pharynx: Oropharynx is clear.   Eyes:      Extraocular Movements: Extraocular movements intact.      Conjunctiva/sclera: Conjunctivae normal.      Pupils: Pupils are equal, round, and reactive to light.   Cardiovascular:      Rate and Rhythm: Normal rate and regular rhythm.      Pulses: Normal pulses.   Pulmonary:      Effort: Pulmonary effort is normal.      Breath sounds: Normal breath sounds.   Abdominal:      General: Bowel sounds are normal.      Palpations: Abdomen is soft.   Musculoskeletal:         General: Tenderness present. Normal range of motion.      Cervical back: Normal range of motion and neck supple.   Skin:     General: Skin is warm and dry.      Capillary Refill: Capillary refill takes less than 2 seconds.      Coloration: Skin is pale.   Neurological:      Mental Status: Mental status is at baseline.      Motor: Weakness present.      Gait: Gait abnormal.   Psychiatric:         Mood and Affect: Affect is flat.         Behavior: Behavior normal.         Thought Content: Thought content normal.         Judgment: Judgment normal.         Assessment/Plan   Problem List Items Addressed This Visit             ICD-10-CM    Debility - Primary R53.81    HTN (hypertension) I10    Morbid obesity (Multi) E66.01    Chronic pain G89.29    Bipolar disorder in partial remission, most recent episode unspecified type (Multi) F31.70     Type 2 diabetes mellitus with hyperglycemia, unspecified whether long term insulin use (Multi) E11.65        Goals    None

## 2024-11-29 NOTE — LETTER
Patient: Fanny Charles  : 1955    Encounter Date: 2024    Subjective  Patient ID: Fanny Charles is a 69 y.o. female who is long term resident being seen and evaluated for multiple medical problems.    DM2, bipolar depression, morbid obesity, htn, debility         Review of Systems   Reason unable to perform ROS: debility.       Objective  There were no vitals taken for this visit.    Physical Exam  Vitals and nursing note reviewed.   Constitutional:       Appearance: She is obese. She is ill-appearing.   HENT:      Head: Normocephalic and atraumatic.      Nose: Nose normal.      Mouth/Throat:      Mouth: Mucous membranes are moist.      Pharynx: Oropharynx is clear.   Eyes:      Extraocular Movements: Extraocular movements intact.      Conjunctiva/sclera: Conjunctivae normal.      Pupils: Pupils are equal, round, and reactive to light.   Cardiovascular:      Rate and Rhythm: Normal rate and regular rhythm.      Pulses: Normal pulses.   Pulmonary:      Effort: Pulmonary effort is normal.      Breath sounds: Normal breath sounds.   Abdominal:      General: Bowel sounds are normal.      Palpations: Abdomen is soft.   Musculoskeletal:         General: Tenderness present. Normal range of motion.      Cervical back: Normal range of motion and neck supple.   Skin:     General: Skin is warm and dry.      Capillary Refill: Capillary refill takes less than 2 seconds.      Coloration: Skin is pale.   Neurological:      Mental Status: Mental status is at baseline.      Motor: Weakness present.      Gait: Gait abnormal.   Psychiatric:         Mood and Affect: Affect is flat.         Behavior: Behavior normal.         Thought Content: Thought content normal.         Judgment: Judgment normal.         Assessment/Plan  Problem List Items Addressed This Visit             ICD-10-CM    Debility - Primary R53.81    HTN (hypertension) I10    Morbid obesity (Multi) E66.01    Chronic pain G89.29    Bipolar disorder in partial  remission, most recent episode unspecified type (Multi) F31.70    Type 2 diabetes mellitus with hyperglycemia, unspecified whether long term insulin use (Multi) E11.65        Goals    None           Electronically Signed By: Jacqui Wesley MD   11/29/24 10:45 AM